# Patient Record
Sex: FEMALE | Race: ASIAN | NOT HISPANIC OR LATINO | Employment: UNEMPLOYED | URBAN - METROPOLITAN AREA
[De-identification: names, ages, dates, MRNs, and addresses within clinical notes are randomized per-mention and may not be internally consistent; named-entity substitution may affect disease eponyms.]

---

## 2017-10-02 ENCOUNTER — APPOINTMENT (OUTPATIENT)
Dept: WOMENS IMAGING | Facility: HOSPITAL | Age: 67
End: 2017-10-02

## 2017-10-02 PROCEDURE — G0202 SCR MAMMO BI INCL CAD: HCPCS | Performed by: RADIOLOGY

## 2017-10-02 PROCEDURE — 77067 SCR MAMMO BI INCL CAD: CPT | Performed by: RADIOLOGY

## 2018-08-10 ENCOUNTER — OFFICE VISIT (OUTPATIENT)
Dept: OBSTETRICS AND GYNECOLOGY | Facility: CLINIC | Age: 68
End: 2018-08-10

## 2018-08-10 VITALS — WEIGHT: 94 LBS | SYSTOLIC BLOOD PRESSURE: 149 MMHG | HEART RATE: 57 BPM | DIASTOLIC BLOOD PRESSURE: 77 MMHG

## 2018-08-10 DIAGNOSIS — Z12.4 PAP SMEAR FOR CERVICAL CANCER SCREENING: ICD-10-CM

## 2018-08-10 DIAGNOSIS — L29.2 VULVAR ITCHING: Primary | ICD-10-CM

## 2018-08-10 PROCEDURE — 99203 OFFICE O/P NEW LOW 30 MIN: CPT | Performed by: OBSTETRICS & GYNECOLOGY

## 2018-08-10 RX ORDER — DIAPER,BRIEF,INFANT-TODD,DISP
EACH MISCELLANEOUS
Qty: 30 G | Refills: 1 | Status: SHIPPED | OUTPATIENT
Start: 2018-08-10 | End: 2018-08-10 | Stop reason: SDUPTHER

## 2018-08-10 RX ORDER — PAROXETINE 10 MG/1
TABLET, FILM COATED ORAL
COMMUNITY
Start: 2018-05-15 | End: 2022-04-29 | Stop reason: SDUPTHER

## 2018-08-10 RX ORDER — LEVOTHYROXINE SODIUM 0.05 MG/1
TABLET ORAL
COMMUNITY
Start: 2018-07-16 | End: 2022-03-04 | Stop reason: SDUPTHER

## 2018-08-10 RX ORDER — LEVOTHYROXINE SODIUM 0.07 MG/1
TABLET ORAL
COMMUNITY
Start: 2018-07-31 | End: 2022-01-28

## 2018-08-10 RX ORDER — DIAPER,BRIEF,INFANT-TODD,DISP
EACH MISCELLANEOUS
Qty: 30 G | Refills: 1 | Status: SHIPPED | OUTPATIENT
Start: 2018-08-10 | End: 2019-08-10

## 2018-08-10 NOTE — PROGRESS NOTES
Subjective   Nereyda Estrella is a 67 y.o. female here for vaginal itching.      History of Present Illness   Patient is a 67-year-old  female that presents today complaining of a two-week history of vulvar and vaginal itching.  She states she was seen at an urgent care center 1 week ago and was given a seven-day course of intravaginal medication for yeast infection.  She states that the vaginal itching has improved but she continues to have itching on her vulva and above her clitoris.  Patient states she underwent menopause at the age of 55 and denies any bleeding since then.  She states she is not sexually active due to pain with intercourse.  She denies any vaginal bleeding.  Patient denies any history of STDs.  She denies any history of abnormal Pap smears.  Patient states that she has recently been using Summers Brenda vaginal wash and denies any changes in detergents, soaps, or fabrics.    The following portions of the patient's history were reviewed and updated as appropriate: allergies, current medications, past family history, past medical history, past social history, past surgical history and problem list.    Review of Systems   Genitourinary: Positive for vaginal pain. Negative for vaginal bleeding.   All other systems reviewed and are negative.      Objective   Physical Exam   Constitutional: She appears well-developed and well-nourished.   Cardiovascular: Normal rate and regular rhythm.    Pulmonary/Chest: Effort normal and breath sounds normal.   Abdominal: Soft. She exhibits no distension. There is no tenderness.   Genitourinary: Cervix normal. There is no rash, tenderness or lesion on the right labia. There is no rash, tenderness or lesion on the left labia. Vagina exhibits loss of rugae. Vagina exhibits no lesion. No erythema, tenderness or bleeding in the vagina. No foreign body in the vagina. No signs of injury around the vagina. No vaginal discharge found.   Neurological: She is alert.    Psychiatric: She has a normal mood and affect.   Vitals reviewed.        Assessment/Plan   Nereyda was seen today for vaginal pain.    Diagnoses and all orders for this visit:    Vulvar itching  -     NuSwab VG+ - Swab, Vagina  -     Discontinue: hydrocortisone 1 % cream; Apply to affected area 2 times daily  -     hydrocortisone 1 % cream; Apply to affected area 2 times daily    Pap smear for cervical cancer screening  -     IGP, Aptima HPV, Rfx 16 / 18,45      No abnormalities were noted on physical exam.  She is noted to have vaginal atrophy.  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  Will plan on trying a course of topical steroid ointment to see if itching improves.  Discussed perineal hygiene and to avoid feminine wash and to use Dove sensitive soap instead.  Also advised to avoid any douching.  She will return in 1 month for followup.

## 2018-08-15 LAB
A VAGINAE DNA VAG QL NAA+PROBE: NORMAL SCORE
BVAB2 DNA VAG QL NAA+PROBE: NORMAL SCORE
C ALBICANS DNA VAG QL NAA+PROBE: NEGATIVE
C GLABRATA DNA VAG QL NAA+PROBE: NEGATIVE
C TRACH RRNA SPEC QL NAA+PROBE: NEGATIVE
CYTOLOGIST CVX/VAG CYTO: NORMAL
CYTOLOGY CVX/VAG DOC THIN PREP: NORMAL
DX ICD CODE: NORMAL
HIV 1 & 2 AB SER-IMP: NORMAL
HPV I/H RISK 4 DNA CVX QL PROBE+SIG AMP: NEGATIVE
MEGA1 DNA VAG QL NAA+PROBE: NORMAL SCORE
N GONORRHOEA RRNA SPEC QL NAA+PROBE: NEGATIVE
OTHER STN SPEC: NORMAL
PATH REPORT.FINAL DX SPEC: NORMAL
STAT OF ADQ CVX/VAG CYTO-IMP: NORMAL
T VAGINALIS RRNA SPEC QL NAA+PROBE: NEGATIVE

## 2018-08-16 ENCOUNTER — TELEPHONE (OUTPATIENT)
Dept: OBSTETRICS AND GYNECOLOGY | Facility: CLINIC | Age: 68
End: 2018-08-16

## 2018-08-16 NOTE — TELEPHONE ENCOUNTER
----- Message from Rosi Dozier MD sent at 8/15/2018  8:38 AM EDT -----  Let patient know her vaginal swab is negative for infection

## 2018-08-27 ENCOUNTER — TELEPHONE (OUTPATIENT)
Dept: OBSTETRICS AND GYNECOLOGY | Facility: CLINIC | Age: 68
End: 2018-08-27

## 2018-08-27 NOTE — TELEPHONE ENCOUNTER
Patient's most recent vaginal culture on 8/10 was negative for yeast infection.  I had started her on topical hydrocortisone and she is scheduled to see me on 9/14.  I would like to see her for followup before I send in any other prescriptions.      Pt called and stated she gets recurring yeast infections. She used to be prescribed a capsule that was gel like and you insert it. Pt stated she does not remember the name of it but was wanting to know if you knew what it was and if so if she can have it called in. Pt stated it worked well for her in the past. Please advise.

## 2018-09-14 ENCOUNTER — OFFICE VISIT (OUTPATIENT)
Dept: OBSTETRICS AND GYNECOLOGY | Facility: CLINIC | Age: 68
End: 2018-09-14

## 2018-09-14 VITALS — WEIGHT: 90.6 LBS | DIASTOLIC BLOOD PRESSURE: 71 MMHG | SYSTOLIC BLOOD PRESSURE: 137 MMHG | HEART RATE: 55 BPM

## 2018-09-14 DIAGNOSIS — L29.2 VULVAR ITCHING: Primary | ICD-10-CM

## 2018-09-14 PROCEDURE — 99213 OFFICE O/P EST LOW 20 MIN: CPT | Performed by: OBSTETRICS & GYNECOLOGY

## 2018-09-14 NOTE — PROGRESS NOTES
Subjective   Nereyda Estrella is a 68 y.o. female here for follow up for vaginal itching     History of Present Illness   Patient presents today for follow-up regarding vulvar itching.  She was seen several months ago and vaginal culture was negative and Pap smear was normal.  She was started on topical hydrocortisone.  Patient did see a mild improvement with hydrocortisone and she states she also used over-the-counter miconazole suppositories.  Today, she is not using any medication for the itching and states that symptoms are not present.  She does complain of some vaginal dryness and feels that her symptoms are related to the dryness.    The following portions of the patient's history were reviewed and updated as appropriate: allergies, current medications, past family history, past medical history, past social history, past surgical history and problem list.    Review of Systems   Genitourinary: Negative for vaginal discharge and vaginal pain.   All other systems reviewed and are negative.      Objective   Physical Exam   Constitutional: She appears well-developed and well-nourished.   Genitourinary: There is no rash, tenderness or lesion on the right labia. There is no rash, tenderness or lesion on the left labia.   Genitourinary Comments: Vaginal atrophy and narrowing of introitus noted   Neurological: She is alert.   Psychiatric: She has a normal mood and affect.   Vitals reviewed.        Assessment/Plan   Nereyda was seen today for follow-up.    Diagnoses and all orders for this visit:    Vulvar itching    Symptoms have currently resolved.  Explained that she may use hydrocortisone as needed for the itching.  Also recommend a vaginal moisturizer to help retain moisture.  Patient may follow-up as needed.

## 2022-01-28 ENCOUNTER — OFFICE VISIT (OUTPATIENT)
Dept: FAMILY MEDICINE CLINIC | Facility: CLINIC | Age: 72
End: 2022-01-28

## 2022-01-28 VITALS
DIASTOLIC BLOOD PRESSURE: 68 MMHG | HEART RATE: 56 BPM | BODY MASS INDEX: 18.42 KG/M2 | WEIGHT: 93.8 LBS | OXYGEN SATURATION: 97 % | SYSTOLIC BLOOD PRESSURE: 102 MMHG | HEIGHT: 60 IN | RESPIRATION RATE: 20 BRPM | TEMPERATURE: 96.2 F

## 2022-01-28 DIAGNOSIS — F41.8 MIXED ANXIETY DEPRESSIVE DISORDER: ICD-10-CM

## 2022-01-28 DIAGNOSIS — Z20.822 EXPOSURE TO COVID-19 VIRUS: ICD-10-CM

## 2022-01-28 DIAGNOSIS — E03.9 HYPOTHYROIDISM (ACQUIRED): Primary | ICD-10-CM

## 2022-01-28 PROCEDURE — 99202 OFFICE O/P NEW SF 15 MIN: CPT | Performed by: FAMILY MEDICINE

## 2022-01-28 NOTE — PROGRESS NOTES
HPI  Nereyda Estrella is a 71 y.o. female who is here to establish new primary care physician along with her . Apparently is on very low-dose thyroid replacement which will be continued pending follow-up visit and hopefully more information obtained from previous physician.  recently exposed to Covid and therefore both patients will be tested. Both are fully vaccinated and deny any symptoms.      Review of Systems   Psychiatric/Behavioral:        Apparently had panic disorder related to a trip overseas and was placed on Paxil which she has continued since   All other systems reviewed and are negative.        Past Medical History:   Diagnosis Date   • Hypothyroidism        No past surgical history on file.    Family History   Problem Relation Age of Onset   • Breast cancer Sister    • Hepatitis Mother    • Cancer Father        Social History     Socioeconomic History   • Marital status: Unknown   Tobacco Use   • Smoking status: Never Smoker   • Smokeless tobacco: Never Used   Substance and Sexual Activity   • Alcohol use: No   • Drug use: No   • Sexual activity: Defer       Vitals:    01/28/22 1026   BP: 102/68   Pulse: 56   Resp: 20   Temp: 96.2 °F (35.7 °C)   SpO2: 97%        Body mass index is 18.32 kg/m².      Physical Exam  Vitals and nursing note reviewed.   Constitutional:       General: She is not in acute distress.     Appearance: She is well-developed.   HENT:      Head: Normocephalic and atraumatic.      Nose:      Comments: Patient and  with masks. Provider with N95 mask head cover shield and gloves as well as gown.  Eyes:      Conjunctiva/sclera: Conjunctivae normal.      Pupils: Pupils are equal, round, and reactive to light.   Neck:      Thyroid: No thyromegaly.   Cardiovascular:      Rate and Rhythm: Normal rate and regular rhythm.      Heart sounds: Normal heart sounds.   Pulmonary:      Effort: Pulmonary effort is normal. No respiratory distress.      Breath sounds: Normal breath  sounds.   Abdominal:      General: There is no distension.      Palpations: Abdomen is soft. There is no mass.      Tenderness: There is no abdominal tenderness.      Hernia: No hernia is present.   Musculoskeletal:         General: No tenderness or deformity. Normal range of motion.      Cervical back: Normal range of motion.   Lymphadenopathy:      Cervical: No cervical adenopathy.   Skin:     General: Skin is warm and dry.      Coloration: Skin is not pale.      Findings: No rash.   Neurological:      General: No focal deficit present.      Mental Status: She is alert and oriented to person, place, and time.      Motor: No abnormal muscle tone.      Coordination: Coordination normal.   Psychiatric:         Mood and Affect: Mood normal.         Behavior: Behavior normal.         Thought Content: Thought content normal.         Judgment: Judgment normal.           Assessment/Plan    Diagnoses and all orders for this visit:    1. Hypothyroidism (acquired) (Primary)    2. Mixed anxiety depressive disorder      Patient here to establish a new primary care physician. Records reviewed as best possible. Hopefully can get more extensive records from previous primary care physician and recheck in 3 months. Especially discussed need to check thyroid panel to see if medication needs to be adjusted?

## 2022-01-30 LAB
LABCORP SARS-COV-2, NAA 2 DAY TAT: NORMAL
SARS-COV-2 RNA RESP QL NAA+PROBE: NOT DETECTED

## 2022-03-04 RX ORDER — LEVOTHYROXINE SODIUM 0.05 MG/1
50 TABLET ORAL DAILY
Qty: 90 TABLET | Refills: 1 | Status: SHIPPED | OUTPATIENT
Start: 2022-03-04 | End: 2022-04-30 | Stop reason: SDUPTHER

## 2022-03-04 NOTE — TELEPHONE ENCOUNTER
Rx Refill Note  Requested Prescriptions     Pending Prescriptions Disp Refills   • levothyroxine (SYNTHROID, LEVOTHROID) 50 MCG tablet        Last office visit with prescribing clinician: 1/28/2022      Next office visit with prescribing clinician: 4/29/2022            Ivana Alcala MA  03/04/22, 08:56 EST

## 2022-03-04 NOTE — TELEPHONE ENCOUNTER
Caller: Nereyda Estrella    Relationship: Self    Best call back number: [phone]    Requested Prescriptions:   Requested Prescriptions     Pending Prescriptions Disp Refills   • levothyroxine (SYNTHROID, LEVOTHROID) 50 MCG tablet          Pharmacy where request should be sent: [facility] [address] [place] [address] [address] AT [facility] - [phone]  - [phone] FX     Additional details provided by patient: PATIENT STATES SHE IS OUT AND HAS AN UPCOMING APPOINTMENT. REQUESTS A 90 DAY REFILL    Does the patient have less than a 3 day supply:  [x] Yes  [] No    Dany Cartwright Rep   03/04/22 08:45 EST

## 2022-04-29 ENCOUNTER — OFFICE VISIT (OUTPATIENT)
Dept: FAMILY MEDICINE CLINIC | Facility: CLINIC | Age: 72
End: 2022-04-29

## 2022-04-29 VITALS
HEART RATE: 62 BPM | HEIGHT: 60 IN | WEIGHT: 90.6 LBS | RESPIRATION RATE: 20 BRPM | DIASTOLIC BLOOD PRESSURE: 68 MMHG | TEMPERATURE: 96.8 F | OXYGEN SATURATION: 96 % | SYSTOLIC BLOOD PRESSURE: 110 MMHG | BODY MASS INDEX: 17.79 KG/M2

## 2022-04-29 DIAGNOSIS — F41.8 MIXED ANXIETY DEPRESSIVE DISORDER: ICD-10-CM

## 2022-04-29 DIAGNOSIS — Z79.899 HIGH RISK MEDICATION USE: ICD-10-CM

## 2022-04-29 DIAGNOSIS — M54.16 LUMBAR RADICULOPATHY, RIGHT: ICD-10-CM

## 2022-04-29 DIAGNOSIS — E03.9 HYPOTHYROIDISM (ACQUIRED): Primary | ICD-10-CM

## 2022-04-29 PROCEDURE — 99213 OFFICE O/P EST LOW 20 MIN: CPT | Performed by: FAMILY MEDICINE

## 2022-04-29 RX ORDER — PAROXETINE 10 MG/1
10 TABLET, FILM COATED ORAL DAILY
Qty: 90 TABLET | Refills: 0 | Status: SHIPPED | OUTPATIENT
Start: 2022-04-29 | End: 2022-08-01 | Stop reason: SDUPTHER

## 2022-04-29 NOTE — PROGRESS NOTES
HPI  Nereyda Estrella is a 71 y.o. female who is here for follow up of anxiety depressive disorder and hypothyroid.  Patient complains of pain going up the right leg.  This seems to be worse when she is standing.  Discussed most likely etiology is lumbar radiculopathy and recommend getting x-ray.  Patient prefers to wait for now.  Discussed getting routine lab work since have yet to obtain old records previously requested?  Is on extremely low-dose thyroid medication and will await results to decide on continuation.      Review of Systems   All other systems reviewed and are negative.        Past Medical History:   Diagnosis Date   • Hypothyroidism        No past surgical history on file.    Family History   Problem Relation Age of Onset   • Breast cancer Sister    • Hepatitis Mother    • Cancer Father        Social History     Socioeconomic History   • Marital status: Unknown   Tobacco Use   • Smoking status: Never Smoker   • Smokeless tobacco: Never Used   Substance and Sexual Activity   • Alcohol use: No   • Drug use: No   • Sexual activity: Defer       Vitals:    04/29/22 1459   BP: 110/68   Pulse: 62   Resp: 20   Temp: 96.8 °F (36 °C)   SpO2: 96%        Body mass index is 17.69 kg/m².      Physical Exam  Vitals and nursing note reviewed. Exam conducted with a chaperone present.   Constitutional:       General: She is not in acute distress.     Appearance: She is well-developed.   HENT:      Head: Normocephalic and atraumatic.      Nose:      Comments: Patient and  with masks.  Provider with mask and shield  Eyes:      Conjunctiva/sclera: Conjunctivae normal.      Pupils: Pupils are equal, round, and reactive to light.   Neck:      Thyroid: No thyromegaly.   Cardiovascular:      Rate and Rhythm: Normal rate and regular rhythm.      Heart sounds: Normal heart sounds.   Pulmonary:      Effort: Pulmonary effort is normal. No respiratory distress.      Breath sounds: Normal breath sounds.   Abdominal:       General: There is no distension.      Palpations: Abdomen is soft. There is no mass.      Tenderness: There is no abdominal tenderness.      Hernia: No hernia is present.   Musculoskeletal:         General: No tenderness or deformity. Normal range of motion.      Cervical back: Normal range of motion.   Lymphadenopathy:      Cervical: No cervical adenopathy.   Skin:     General: Skin is warm and dry.      Coloration: Skin is not pale.      Findings: No rash.   Neurological:      General: No focal deficit present.      Mental Status: She is alert and oriented to person, place, and time.      Motor: No abnormal muscle tone.      Coordination: Coordination normal.   Psychiatric:         Mood and Affect: Mood normal.         Behavior: Behavior normal.         Thought Content: Thought content normal.         Judgment: Judgment normal.           Assessment/Plan    Diagnoses and all orders for this visit:    1. Hypothyroidism (acquired) (Primary)  -     TSH+Free T4    2. High risk medication use  -     CBC & Differential  -     Comprehensive Metabolic Panel    3. Lumbar radiculopathy, right        Patient here for routine follow-up after initial visit.  Reports pain going up the right leg as discussed above.  Strongly suspect lumbar radicular symptoms and recommend getting x-ray but patient prefers to wait for now.  Will await lab work to decide if need to adjust thyroid medication.  Otherwise recommend finding new primary care physician at next visit and hopefully they will have better luck finding old records?  Also hopefully new primary care physician can update health maintenance issues.  Would assume gynecologist and have got mammograms bone density tests etc.?

## 2022-04-30 LAB
ALBUMIN SERPL-MCNC: 4.8 G/DL (ref 3.7–4.7)
ALBUMIN/GLOB SERPL: 2.1 {RATIO} (ref 1.2–2.2)
ALP SERPL-CCNC: 100 IU/L (ref 44–121)
ALT SERPL-CCNC: 15 IU/L (ref 0–32)
AST SERPL-CCNC: 14 IU/L (ref 0–40)
BASOPHILS # BLD AUTO: 0 X10E3/UL (ref 0–0.2)
BASOPHILS NFR BLD AUTO: 1 %
BILIRUB SERPL-MCNC: 0.3 MG/DL (ref 0–1.2)
BUN SERPL-MCNC: 23 MG/DL (ref 8–27)
BUN/CREAT SERPL: 32 (ref 12–28)
CALCIUM SERPL-MCNC: 10 MG/DL (ref 8.7–10.3)
CHLORIDE SERPL-SCNC: 102 MMOL/L (ref 96–106)
CO2 SERPL-SCNC: 22 MMOL/L (ref 20–29)
CREAT SERPL-MCNC: 0.71 MG/DL (ref 0.57–1)
EGFRCR SERPLBLD CKD-EPI 2021: 91 ML/MIN/1.73
EOSINOPHIL # BLD AUTO: 0.2 X10E3/UL (ref 0–0.4)
EOSINOPHIL NFR BLD AUTO: 4 %
ERYTHROCYTE [DISTWIDTH] IN BLOOD BY AUTOMATED COUNT: 12.9 % (ref 11.7–15.4)
GLOBULIN SER CALC-MCNC: 2.3 G/DL (ref 1.5–4.5)
GLUCOSE SERPL-MCNC: 94 MG/DL (ref 65–99)
HCT VFR BLD AUTO: 41.4 % (ref 34–46.6)
HGB BLD-MCNC: 14 G/DL (ref 11.1–15.9)
IMM GRANULOCYTES # BLD AUTO: 0 X10E3/UL (ref 0–0.1)
IMM GRANULOCYTES NFR BLD AUTO: 0 %
LYMPHOCYTES # BLD AUTO: 1.7 X10E3/UL (ref 0.7–3.1)
LYMPHOCYTES NFR BLD AUTO: 32 %
MCH RBC QN AUTO: 31.3 PG (ref 26.6–33)
MCHC RBC AUTO-ENTMCNC: 33.8 G/DL (ref 31.5–35.7)
MCV RBC AUTO: 93 FL (ref 79–97)
MONOCYTES # BLD AUTO: 0.3 X10E3/UL (ref 0.1–0.9)
MONOCYTES NFR BLD AUTO: 6 %
NEUTROPHILS # BLD AUTO: 3 X10E3/UL (ref 1.4–7)
NEUTROPHILS NFR BLD AUTO: 57 %
PLATELET # BLD AUTO: 267 X10E3/UL (ref 150–450)
POTASSIUM SERPL-SCNC: 4.2 MMOL/L (ref 3.5–5.2)
PROT SERPL-MCNC: 7.1 G/DL (ref 6–8.5)
RBC # BLD AUTO: 4.47 X10E6/UL (ref 3.77–5.28)
SODIUM SERPL-SCNC: 140 MMOL/L (ref 134–144)
T4 FREE SERPL-MCNC: 1.24 NG/DL (ref 0.82–1.77)
TSH SERPL DL<=0.005 MIU/L-ACNC: 1.19 UIU/ML (ref 0.45–4.5)
WBC # BLD AUTO: 5.3 X10E3/UL (ref 3.4–10.8)

## 2022-04-30 RX ORDER — LEVOTHYROXINE SODIUM 0.05 MG/1
50 TABLET ORAL DAILY
Qty: 90 TABLET | Refills: 1 | Status: SHIPPED | OUTPATIENT
Start: 2022-04-30 | End: 2022-08-01 | Stop reason: SDUPTHER

## 2022-07-14 ENCOUNTER — OFFICE VISIT (OUTPATIENT)
Dept: INTERNAL MEDICINE | Facility: CLINIC | Age: 72
End: 2022-07-14

## 2022-07-14 VITALS
OXYGEN SATURATION: 95 % | TEMPERATURE: 97.2 F | BODY MASS INDEX: 17.87 KG/M2 | SYSTOLIC BLOOD PRESSURE: 146 MMHG | DIASTOLIC BLOOD PRESSURE: 74 MMHG | HEART RATE: 66 BPM | WEIGHT: 91 LBS | HEIGHT: 60 IN

## 2022-07-14 DIAGNOSIS — F41.9 ANXIETY AND DEPRESSION: ICD-10-CM

## 2022-07-14 DIAGNOSIS — E03.9 HYPOTHYROIDISM, UNSPECIFIED TYPE: Primary | ICD-10-CM

## 2022-07-14 DIAGNOSIS — R03.0 ELEVATED BLOOD PRESSURE READING: ICD-10-CM

## 2022-07-14 DIAGNOSIS — R10.13 DYSPEPSIA: ICD-10-CM

## 2022-07-14 DIAGNOSIS — F32.A ANXIETY AND DEPRESSION: ICD-10-CM

## 2022-07-14 DIAGNOSIS — Z13.220 SCREENING FOR HYPERLIPIDEMIA: ICD-10-CM

## 2022-07-14 DIAGNOSIS — Z11.59 NEED FOR HEPATITIS C SCREENING TEST: ICD-10-CM

## 2022-07-14 DIAGNOSIS — Z12.31 ENCOUNTER FOR SCREENING MAMMOGRAM FOR MALIGNANT NEOPLASM OF BREAST: ICD-10-CM

## 2022-07-14 PROCEDURE — 99214 OFFICE O/P EST MOD 30 MIN: CPT | Performed by: STUDENT IN AN ORGANIZED HEALTH CARE EDUCATION/TRAINING PROGRAM

## 2022-07-14 NOTE — PROGRESS NOTES
Brandin Rodriguez D.O.  Internal Medicine  [facility]  [address], [address]  [place] [address]  [phone]      Chief Complaint  Establish Care    SUBJECTIVE    History of Present Illness    Nereyda Estrella is a 71 y.o. female who presents to the office today as a new patient to establish care.   Transferring care from Dr Cuevas who is retiring. Patient only saw him for several months. Prior to that she went to [facility] Hayden Jr., MD with [facility].     Anxiety and depression: takes paroxetine 10 mg daily, feels that it helps. She does prayer and deep breathing if she is getting anxious.     Hypothyroidism: takes levothyroxine 50 mcg daily; denies having any thyroid nodules/cancer but states she had enlarged eyes at one time and it may have been high at that time, years ago.     Over the last few months she has felt especially gassy, felt discomfort in the top/center of the stomach.   Taking a few sips of 7 up helps. No blood in her stool. Feels that she may have been losing a few pounds of weight but states that she is on a soft diet because of dentures making it hard for her to chew. She has not had trouble swallowing.     Allergies   Allergen Reactions   • Aspirin Swelling        Outpatient Medications Marked as Taking for the 7/14/22 encounter (Office Visit) with Brandin Rodriguez, DO   Medication Sig Dispense Refill   • levothyroxine (SYNTHROID, LEVOTHROID) 50 MCG tablet Take 1 tablet by mouth Daily. 90 tablet 1   • PARoxetine (PAXIL) 10 MG tablet Take 1 tablet by mouth Daily. 90 tablet 0        Past Medical History:   Diagnosis Date   • Anxiety and depression    • Hypothyroidism      Past Surgical History:   Procedure Laterality Date   • NO PAST SURGERIES       Family History   Problem Relation Age of Onset   • Hepatitis Mother         from blood transfusion   • Prostate cancer Father    • Breast cancer Sister    • No Known Problems Sister    • No Known Problems Sister    • No  "Known Problems Sister    • No Known Problems Brother    • No Known Problems Brother    • No Known Problems Brother     reports that she has quit smoking. Her smoking use included cigarettes. She smoked 0.10 packs per day. She has never used smokeless tobacco. She reports current alcohol use of about 7.0 standard drinks of alcohol per week. She reports that she does not use drugs.    OBJECTIVE    Vital Signs:   /74   Pulse 66   Temp 97.2 °F (36.2 °C) (Temporal)   Ht 152.4 cm (60\")   Wt 41.3 kg (91 lb)   SpO2 95%   BMI 17.77 kg/m²     Physical Exam  Vitals reviewed.   Constitutional:       General: She is not in acute distress.     Appearance: Normal appearance. She is not ill-appearing.      Comments: Underweight     HENT:      Head: Normocephalic and atraumatic.   Eyes:      General: No scleral icterus.  Cardiovascular:      Rate and Rhythm: Normal rate and regular rhythm.      Heart sounds: Normal heart sounds. No murmur heard.  Pulmonary:      Effort: Pulmonary effort is normal. No respiratory distress.      Breath sounds: Normal breath sounds. No wheezing or rhonchi.   Abdominal:      General: Bowel sounds are normal. There is no distension.      Palpations: Abdomen is soft.      Tenderness: There is no abdominal tenderness. There is no guarding.   Musculoskeletal:      Right lower leg: No edema.      Left lower leg: No edema.   Neurological:      Mental Status: She is alert.   Psychiatric:         Mood and Affect: Mood normal.         Behavior: Behavior normal.         Thought Content: Thought content normal.            The following data was reviewed by: Brandin Rodriguez DO on 07/14/2022:  Common labs    Common Labsle 4/29/22 4/29/22    1544 1544   Glucose  94   BUN  23   Creatinine  0.71   Sodium  140   Potassium  4.2   Chloride  102   Calcium  10.0   Total Protein  7.1   Albumin  4.8 (A)   Total Bilirubin  0.3   Alkaline Phosphatase  100   AST (SGOT)  14   ALT (SGPT)  15   WBC 5.3    Hemoglobin 14.0  "   Hematocrit 41.4    Platelets 267    (A) Abnormal value                         ASSESSMENT & PLAN     Diagnoses and all orders for this visit:    1. Hypothyroidism, unspecified type (Primary)  Lab Results   Component Value Date    TSH 1.190 04/29/2022     -TSH at goal on levothyroxine 50 mcg daily , continue current regimen      2. Elevated blood pressure reading  -/74 in office  -no history of HTN  -recommended pt get automated BP machine, check BP 3-4 times weekly while being well rested and without caffeine; bring log to next visit     3. Dyspepsia  -several month duration of increased abdominal gas/fullness as well as discomfort sensation in the epigastric region; denies blood in the stool or dysphagia  -no abdominal pain on exam  -in the setting of having some weight loss, BMI 17.8, as well as age I would like for her to see GI for consideration of EGD  -patient seemed hesitant to this but I explained importance of identifying gastritis/ulcers/potential cancer worse case and she seemed agreeable to referral    4. Anxiety and depression  -controlled with paroxetine 10 mg daily, continue     5. Need for hepatitis C screening test  -     Hepatitis C antibody    6. Screening for hyperlipidemia  -     Lipid Panel With LDL/HDL Ratio    7. Encounter for screening mammogram for malignant neoplasm of breast  -     Mammo Screening Digital Tomosynthesis Bilateral With CAD            The following social determinates of health impact the patient's medical decision making: No social determinates of health were factored in to today's visit.     Follow Up  Return in about 2 months (around 9/14/2022) for Annual physical.    Patient/family had no further questions at this time and verbalized understanding of the plan discussed today.

## 2022-07-15 ENCOUNTER — PATIENT ROUNDING (BHMG ONLY) (OUTPATIENT)
Dept: INTERNAL MEDICINE | Facility: CLINIC | Age: 72
End: 2022-07-15

## 2022-07-15 LAB
CHOLEST SERPL-MCNC: 285 MG/DL (ref 100–199)
HCV AB S/CO SERPL IA: 0.1 S/CO RATIO (ref 0–0.9)
HDLC SERPL-MCNC: 62 MG/DL
LDLC SERPL CALC-MCNC: 204 MG/DL (ref 0–99)
LDLC/HDLC SERPL: 3.3 RATIO (ref 0–3.2)
TRIGL SERPL-MCNC: 111 MG/DL (ref 0–149)
VLDLC SERPL CALC-MCNC: 19 MG/DL (ref 5–40)

## 2022-07-19 ENCOUNTER — APPOINTMENT (OUTPATIENT)
Dept: WOMENS IMAGING | Facility: HOSPITAL | Age: 72
End: 2022-07-19

## 2022-07-19 PROCEDURE — 77063 BREAST TOMOSYNTHESIS BI: CPT | Performed by: RADIOLOGY

## 2022-07-19 PROCEDURE — 77067 SCR MAMMO BI INCL CAD: CPT | Performed by: RADIOLOGY

## 2022-08-01 DIAGNOSIS — F41.8 MIXED ANXIETY DEPRESSIVE DISORDER: ICD-10-CM

## 2022-08-01 RX ORDER — LEVOTHYROXINE SODIUM 0.05 MG/1
50 TABLET ORAL DAILY
Qty: 90 TABLET | Refills: 1 | Status: SHIPPED | OUTPATIENT
Start: 2022-08-01

## 2022-08-01 RX ORDER — PAROXETINE 10 MG/1
10 TABLET, FILM COATED ORAL DAILY
Qty: 90 TABLET | Refills: 3 | Status: SHIPPED | OUTPATIENT
Start: 2022-08-01

## 2022-08-01 NOTE — TELEPHONE ENCOUNTER
Caller: Sameer Zuleika    Relationship: Self    Best call back number: [phone]    Requested Prescriptions:   Requested Prescriptions     Pending Prescriptions Disp Refills   • PARoxetine (PAXIL) 10 MG tablet 90 tablet 0     Sig: Take 1 tablet by mouth Daily.   • levothyroxine (SYNTHROID, LEVOTHROID) 50 MCG tablet 90 tablet 1     Sig: Take 1 tablet by mouth Daily.        Pharmacy where request should be sent: [facility] [address] [place] [address] RD AT [facility] [phone] [facility] [phone] FX     Additional details provided by patient: PATIENT IS OUT OF HER PAROXETINE TODAY , BUT HAS A WEEK LEFT OF THE LEVOTHYROXINE.  PATIENT ALSO STATED THAT [facility] ADVISED HER THAT THEY REQUESTED HER MEDICATION ON 7/26, BUT NO RESPONSE.    Does the patient have less than a 3 day supply:  [x] Yes  [] No    Dany Mcgrath   08/01/22 09:07 EDT

## 2022-08-02 ENCOUNTER — TELEPHONE (OUTPATIENT)
Dept: INTERNAL MEDICINE | Facility: CLINIC | Age: 72
End: 2022-08-02

## 2022-08-26 ENCOUNTER — APPOINTMENT (OUTPATIENT)
Dept: WOMENS IMAGING | Facility: HOSPITAL | Age: 72
End: 2022-08-26

## 2022-08-26 PROCEDURE — 77061 BREAST TOMOSYNTHESIS UNI: CPT | Performed by: RADIOLOGY

## 2022-08-26 PROCEDURE — 76642 ULTRASOUND BREAST LIMITED: CPT | Performed by: RADIOLOGY

## 2022-08-26 PROCEDURE — 77065 DX MAMMO INCL CAD UNI: CPT | Performed by: RADIOLOGY

## 2022-08-26 PROCEDURE — G0279 TOMOSYNTHESIS, MAMMO: HCPCS | Performed by: RADIOLOGY

## 2022-10-05 ENCOUNTER — OFFICE VISIT (OUTPATIENT)
Dept: INTERNAL MEDICINE | Facility: CLINIC | Age: 72
End: 2022-10-05

## 2022-10-05 VITALS
WEIGHT: 91.2 LBS | HEART RATE: 60 BPM | BODY MASS INDEX: 17.91 KG/M2 | TEMPERATURE: 97.8 F | SYSTOLIC BLOOD PRESSURE: 140 MMHG | OXYGEN SATURATION: 97 % | HEIGHT: 60 IN | DIASTOLIC BLOOD PRESSURE: 72 MMHG

## 2022-10-05 DIAGNOSIS — Z00.00 ANNUAL PHYSICAL EXAM: Primary | ICD-10-CM

## 2022-10-05 DIAGNOSIS — I10 ESSENTIAL HYPERTENSION: ICD-10-CM

## 2022-10-05 DIAGNOSIS — Z78.0 POSTMENOPAUSAL: ICD-10-CM

## 2022-10-05 DIAGNOSIS — E78.00 PURE HYPERCHOLESTEROLEMIA: ICD-10-CM

## 2022-10-05 DIAGNOSIS — H61.23 BILATERAL IMPACTED CERUMEN: ICD-10-CM

## 2022-10-05 PROCEDURE — 99397 PER PM REEVAL EST PAT 65+ YR: CPT | Performed by: STUDENT IN AN ORGANIZED HEALTH CARE EDUCATION/TRAINING PROGRAM

## 2022-10-05 PROCEDURE — 99214 OFFICE O/P EST MOD 30 MIN: CPT | Performed by: STUDENT IN AN ORGANIZED HEALTH CARE EDUCATION/TRAINING PROGRAM

## 2022-10-05 RX ORDER — AMLODIPINE BESYLATE 2.5 MG/1
2.5 TABLET ORAL DAILY
Qty: 30 TABLET | Refills: 1 | Status: SHIPPED | OUTPATIENT
Start: 2022-10-05 | End: 2022-11-30

## 2022-10-05 RX ORDER — ATORVASTATIN CALCIUM 40 MG/1
40 TABLET, FILM COATED ORAL DAILY
Qty: 90 TABLET | Refills: 1 | Status: SHIPPED | OUTPATIENT
Start: 2022-10-05 | End: 2023-03-23

## 2022-10-05 NOTE — PROGRESS NOTES
Brandin Rodriguez D.O.  Internal Medicine  [facility]  [address], [address]  [place] [address]  [phone]    Chief Complaint  Annual checkup    HISTORY    Nereyda Estrella is a 72 y.o. female who presents to the office today as a  an established patient for their annual preventative exam.     No hospitalization(s) within the last year.     Status of chronic medical conditions:    Anxiety and depression: takes paroxetine 10 mg daily, feels that it helps.      Hypothyroidism: takes levothyroxine 50 mcg daily; denies having any thyroid nodules/cancer but states she had enlarged eyes at one time and it may have been high at that time, years ago.      Patient's overall comments about their health or any other specific health concerns they report: none    First day of last menstrual period if pre-menopausal: post menopausal       Health Maintenance Summary          Ordered - MAMMOGRAM (Every 2 Years) Ordered on 7/14/2022    No completion history exists for this topic.          Ordered - DXA SCAN (Every 2 Years) Ordered on 10/5/2022    No completion history exists for this topic.          Overdue - TDAP/TD VACCINES (1 - Tdap) Overdue - never done    No completion history exists for this topic.          Overdue - ZOSTER VACCINE (1 of 2) Overdue - never done    No completion history exists for this topic.          Overdue - Pneumococcal Vaccine 65+ (1 - PCV) Overdue - never done    No completion history exists for this topic.          Overdue - INFLUENZA VACCINE (Yearly - August to March) Overdue - never done    No completion history exists for this topic.          LIPID PANEL (Yearly) Next due on 7/14/2023 07/14/2022  Lipid Panel With LDL/HDL Ratio          ANNUAL PHYSICAL (Yearly) Next due on 10/6/2023    10/05/2022  Done          COLORECTAL CANCER SCREENING (COLOGUARD - Every 3 Years) Next due on 10/8/2023    10/08/2020  SCANNED - COLOGUARD          HEPATITIS C SCREENING  Completed     "07/14/2022  Hep C Virus Ab component of Hepatitis C antibody          COVID-19 Vaccine (Series Information) Completed    09/23/2022  Imm Admin: COVID-19 (PFIZER) BIVALENT BOOSTER 12+YRS    11/22/2021  Imm Admin: COVID-19 (PFIZER) PURPLE CAP    03/31/2021  Imm Admin: COVID-19 (PFIZER) PURPLE CAP    03/10/2021  Imm Admin: COVID-19 (PFIZER) PURPLE CAP                 Allergies   Allergen Reactions   • Aspirin Swelling        Outpatient Medications Marked as Taking for the 10/5/22 encounter (Office Visit) with Brandin Rodriguez DO   Medication Sig Dispense Refill   • levothyroxine (SYNTHROID, LEVOTHROID) 50 MCG tablet Take 1 tablet by mouth Daily. Take at least 30 minutes before having breakfast with a sip of water. For low thyroid. 90 tablet 1   • PARoxetine (PAXIL) 10 MG tablet Take 1 tablet by mouth Daily. 90 tablet 3        Past Medical History:   Diagnosis Date   • Anxiety and depression    • Hyperlipidemia    • Hypertension 2022   • Hypothyroidism      Past Surgical History:   Procedure Laterality Date   • NO PAST SURGERIES       Family History   Problem Relation Age of Onset   • Hepatitis Mother         from blood transfusion   • Prostate cancer Father    • Breast cancer Sister    • No Known Problems Sister    • No Known Problems Sister    • No Known Problems Sister    • No Known Problems Brother    • No Known Problems Brother    • No Known Problems Brother     reports that she has quit smoking. Her smoking use included cigarettes. She smoked 0.10 packs per day. She has never used smokeless tobacco. She reports current alcohol use of about 7.0 standard drinks of alcohol per week. She reports that she does not use drugs.    Immunization History   Administered Date(s) Administered   • COVID-19 (PFIZER) BIVALENT BOOSTER 12+YRS 09/23/2022   • COVID-19 (PFIZER) PURPLE CAP 03/10/2021, 03/31/2021, 11/22/2021        OBJECTIVE    Vital Signs:   /72   Pulse 60   Temp 97.8 °F (36.6 °C) (Infrared)   Ht 152.4 cm (60\")   " Wt 41.4 kg (91 lb 3.2 oz)   SpO2 97%   BMI 17.81 kg/m²     Physical Exam  Vitals reviewed.   Constitutional:       General: She is not in acute distress.     Appearance: Normal appearance. She is not ill-appearing.   HENT:      Head: Normocephalic and atraumatic.      Right Ear: External ear normal. There is impacted cerumen.      Left Ear: External ear normal. There is impacted cerumen.      Mouth/Throat:      Mouth: Mucous membranes are moist.      Pharynx: No oropharyngeal exudate or posterior oropharyngeal erythema.   Eyes:      General: No scleral icterus.     Extraocular Movements: Extraocular movements intact.      Conjunctiva/sclera: Conjunctivae normal.      Pupils: Pupils are equal, round, and reactive to light.   Cardiovascular:      Rate and Rhythm: Normal rate and regular rhythm.      Heart sounds: Normal heart sounds. No murmur heard.  Pulmonary:      Effort: Pulmonary effort is normal. No respiratory distress.      Breath sounds: Normal breath sounds. No wheezing.   Abdominal:      General: Bowel sounds are normal. There is no distension.      Palpations: Abdomen is soft.      Tenderness: There is no abdominal tenderness. There is no guarding.   Musculoskeletal:      Cervical back: Neck supple. No tenderness.      Right lower leg: No edema.      Left lower leg: No edema.   Lymphadenopathy:      Cervical: No cervical adenopathy.   Skin:     General: Skin is warm and dry.      Coloration: Skin is not jaundiced.   Neurological:      General: No focal deficit present.      Mental Status: She is alert and oriented to person, place, and time.      Cranial Nerves: No cranial nerve deficit.      Motor: No weakness.   Psychiatric:         Mood and Affect: Mood normal.         Behavior: Behavior normal.         Thought Content: Thought content normal.                         ASSESSMENT & PLAN     #Annual Preventative Health Examination   -Age and sex appropriate physical exam performed and documented. Updated  past medical, family, social and surgical histories as well as allergies and care team list. Addressed care gaps listed in the medical record.  -Encouraged seat belt use for every car ride for patient and all occupants. Encouraged sunscreen use to reduce risk of skin cancer for any days with sun exposure over 20 minutes.   -Encouraged annual dental and vision exams as part of their overall health.  -Encouraged minimum of 30 minutes or more of exercise at a brisk walk or higher 5 days per week combined with a well-balanced diet.  -Immunizations reviewed and updated in EMR. She declined flu shot today. Encouraged her to get her immunization records from previous PCP so that I can make further recommendations.     The 10-year ASCVD risk score (Bradly LANDEROS Jr., et al., 2013) is: 19.4%    Values used to calculate the score:      Age: 72 years      Sex: Female      Is Non- : No      Diabetic: No      Tobacco smoker: No      Systolic Blood Pressure: 140 mmHg      Is BP treated: Yes      HDL Cholesterol: 62 mg/dL      Total Cholesterol: 285 mg/dL   -Lipid screening:   Lipid Panel    Lipid Panel 7/14/22   Total Cholesterol 285 (A)   Triglycerides 111   HDL Cholesterol 62   VLDL Cholesterol 19   LDL Cholesterol  204 (A)   LDL/HDL Ratio 3.3 (A)   (A) Abnormal value       Comments are available for some flowsheets but are not being displayed.          Patient is over age 40 and a 10-year ASCVD risk was calculated which does indicate a need for statin therapy. See plan below.    -Aspirin for primary or secondary prevention: Not applicable, patient is greater than age 60 and risks outweigh benefits for primary prevention.  -Depression screening: pt has diagnosis of anxiety and depression  -Diabetes screening:  Screening not indicated at this time.   -Tobacco use screening: Patient denies cigarette use. Tobacco counseling was was not indicated.  -Alcohol use screening: Patient reports drinking 7 drinks per  week.. Alcohol abuse counseling was was not indicated.  -Illicit drug screening: Patient does not use illicit drugs.  -Hypertension screening: + screening today, see plan below  -HIV screening: Discussed with patient the importance of identifying asymptomatic HIV infection for adults in their age group with a one time screening test .Patient declined screening.   -Syphilis screening: Syphilis screening not indicated.  -Hepatitis B virus screening: Screening not indicated, not in a high-risk group.  -Hepatitis C virus screening:  Patient has already completed Hepatitis C screening. Negative screening on file.   -Colon cancer screening: Last cologuard negative 10/2020, due for repeat 10/2023  -Lung cancer screening: Patient has smoked but does not meet other eligibility criteria for screening.  -Cervical cancer screening:  Informed patient that the USPSTF recommends screening for cervical cancer every 3 years with cervical cytology alone in women aged 21 to 29 years. For women aged 30 to 65 years, the USPSTF recommends screening every 3 years with cervical cytology alone, every 5 years with high-risk human papillomavirus (hrHPV) testing alone, or every 5 years with HPV testing in combination with cytology (cotesting). No longer of screening age.   -Breast cancer screening: Will obtain results from [facility].   -BRCA related cancer screening: Informed patient that the USPSTF recommends that primary care clinicians assess women with a personal or family history of breast, ovarian, tubal, or peritoneal cancer or who have an ancestry associated with breast cancer susceptibility 1 and 2 (BRCA1/2) gene mutations with an appropriate brief familial risk assessment tool and referred to genetic counseling if risk assessment is positive. Patient does not have a known personal or family history.   -Osteoporosis screening: Informed patient that the USPSTF recommends screening for osteoporosis with bone measurement  testing to prevent osteoporotic fractures in women 65 years and older. Will order screening today    Follow up in 1 year for annual physical exam.      A problem-based visit was also conducted on the same day, see below for assessment and plan    Diagnoses and all orders for this visit:    1. Essential hypertension (Primary)  -new diagnosis as of today  -BP has been 140/72 and 146/74 in her last two office visits with me  -she also has HLD, advised pt of importance of managing blood pressure and cholesterol to prevent future CV events  -she was agreeable to treatment, will start amlodipine 2.5 mg daily; advised her of common side effects including lower extremity edema  -follow up in 1 month for repeat BP  -     amLODIPine (NORVASC) 2.5 MG tablet; Take 1 tablet by mouth Daily.  Dispense: 30 tablet; Refill: 1    2. Pure hypercholesterolemia  Lab Results   Component Value Date    CHLPL 285 (H) 07/14/2022    TRIG 111 07/14/2022    HDL 62 07/14/2022     (H) 07/14/2022     The 10-year ASCVD risk score (Kearsargestuart LANDEROS Jr., et al., 2013) is: 19.4%    Values used to calculate the score:      Age: 72 years      Sex: Female      Is Non- : No      Diabetic: No      Tobacco smoker: No      Systolic Blood Pressure: 140 mmHg      Is BP treated: Yes      HDL Cholesterol: 62 mg/dL      Total Cholesterol: 285 mg/dL  Discussed with patient the significance of his elevated ASCVD risk and LDL cholesterol. Introduced statin based therapy. Informed pt that statins are associated with rare risk of increased liver enzymes as well as severe allergy. More common side effects include muscle aches and pains that usually go away within several weeks of therapy.   -pt reports being on atorvastatin before and tolerated it   -will restart atorvastatin at 40 mg daily  -     atorvastatin (Lipitor) 40 MG tablet; Take 1 tablet by mouth Daily.  Dispense: 90 tablet; Refill: 1    3. Bilateral impacted cerumen       -begin ear wax  softening drops           The following social determinates of health impact the patient's medical decision making: No social determinates of health were factored in to today's visit.     Follow Up  Return in about 4 weeks (around 11/2/2022) for Recheck.    Patient/family had no further questions at this time and verbalized understanding of the plan discussed today.

## 2022-11-03 ENCOUNTER — OFFICE VISIT (OUTPATIENT)
Dept: INTERNAL MEDICINE | Facility: CLINIC | Age: 72
End: 2022-11-03

## 2022-11-03 VITALS
SYSTOLIC BLOOD PRESSURE: 128 MMHG | WEIGHT: 91 LBS | TEMPERATURE: 98 F | OXYGEN SATURATION: 97 % | DIASTOLIC BLOOD PRESSURE: 64 MMHG | BODY MASS INDEX: 17.87 KG/M2 | HEIGHT: 60 IN | HEART RATE: 62 BPM

## 2022-11-03 DIAGNOSIS — Z28.21 INFLUENZA VACCINATION DECLINED: ICD-10-CM

## 2022-11-03 DIAGNOSIS — E78.00 PURE HYPERCHOLESTEROLEMIA: ICD-10-CM

## 2022-11-03 DIAGNOSIS — I10 ESSENTIAL HYPERTENSION: Primary | ICD-10-CM

## 2022-11-03 PROCEDURE — 99214 OFFICE O/P EST MOD 30 MIN: CPT | Performed by: STUDENT IN AN ORGANIZED HEALTH CARE EDUCATION/TRAINING PROGRAM

## 2022-11-03 NOTE — PROGRESS NOTES
"  Brandin Rodriguez D.O.  Internal Medicine  [facility]  [address], [address]  [place] [address]  [phone]      Chief Complaint  Hypertension    SUBJECTIVE    History of Present Illness    Nereyda Estrella is a 72 y.o. female who presents to the office today as an established patient that last saw me on 10/5/2022.     HTN: at last visit started on amlodipine 2.5 mg daily; no side effects with that medication. Doesn't check blood pressure at home.     HLD: at last visit started on atorvastatin 40 mg daily for primary prevention. No side effects that she is aware of.    Allergies   Allergen Reactions   • Aspirin Swelling        Outpatient Medications Marked as Taking for the 11/3/22 encounter (Office Visit) with Brandin Rodriguez, DO   Medication Sig Dispense Refill   • amLODIPine (NORVASC) 2.5 MG tablet Take 1 tablet by mouth Daily. 30 tablet 1   • atorvastatin (Lipitor) 40 MG tablet Take 1 tablet by mouth Daily. 90 tablet 1   • levothyroxine (SYNTHROID, LEVOTHROID) 50 MCG tablet Take 1 tablet by mouth Daily. Take at least 30 minutes before having breakfast with a sip of water. For low thyroid. 90 tablet 1   • PARoxetine (PAXIL) 10 MG tablet Take 1 tablet by mouth Daily. 90 tablet 3        Past Medical History:   Diagnosis Date   • Anxiety and depression    • Hyperlipidemia    • Hypertension 2022   • Hypothyroidism        OBJECTIVE    Vital Signs:   /64   Pulse 62   Temp 98 °F (36.7 °C) (Infrared)   Ht 152.4 cm (60\")   Wt 41.3 kg (91 lb)   SpO2 97%   BMI 17.77 kg/m²     Physical Exam  Vitals reviewed.   Constitutional:       General: She is not in acute distress.     Appearance: Normal appearance. She is not ill-appearing.      Comments: underweight   HENT:      Head: Normocephalic and atraumatic.   Eyes:      General: No scleral icterus.  Cardiovascular:      Rate and Rhythm: Normal rate and regular rhythm.      Heart sounds: Normal heart sounds. No murmur heard.  Pulmonary:      " Effort: Pulmonary effort is normal. No respiratory distress.      Breath sounds: Normal breath sounds. No wheezing.   Musculoskeletal:      Right lower leg: No edema.      Left lower leg: No edema.   Skin:     Coloration: Skin is not jaundiced.   Neurological:      Mental Status: She is alert.   Psychiatric:         Mood and Affect: Mood normal.         Behavior: Behavior normal.         Thought Content: Thought content normal.                             ASSESSMENT & PLAN     Diagnoses and all orders for this visit:    1. Essential hypertension (Primary)  -started on amlodipine 2.5 mg daily last visit  -does not check her BP at home but BP in office today is 128/64  -continue current regimen    2. Pure hypercholesterolemia  -at last visit starting atorvastatin 40 mg daily for primary prevention; no side effects reported  -will recheck lipid profile today to assess for improvement; last lipid profile is below  Lab Results   Component Value Date    CHLPL 285 (H) 07/14/2022    TRIG 111 07/14/2022    HDL 62 07/14/2022     (H) 07/14/2022       -     Lipid Panel With LDL/HDL Ratio            The following social determinates of health impact the patient's medical decision making: No social determinates of health were factored in to today's visit.     Follow Up  Return in about 6 months (around 5/3/2023) for Recheck.    Patient/family had no further questions at this time and verbalized understanding of the plan discussed today.

## 2022-11-04 LAB
CHOLEST SERPL-MCNC: 142 MG/DL (ref 0–200)
HDLC SERPL-MCNC: 60 MG/DL (ref 40–60)
LDLC SERPL CALC-MCNC: 66 MG/DL (ref 0–100)
LDLC/HDLC SERPL: 1.09 {RATIO}
TRIGL SERPL-MCNC: 84 MG/DL (ref 0–150)
VLDLC SERPL CALC-MCNC: 16 MG/DL (ref 5–40)

## 2022-11-29 DIAGNOSIS — I10 ESSENTIAL HYPERTENSION: ICD-10-CM

## 2022-11-30 RX ORDER — AMLODIPINE BESYLATE 2.5 MG/1
TABLET ORAL
Qty: 30 TABLET | Refills: 1 | Status: SHIPPED | OUTPATIENT
Start: 2022-11-30 | End: 2023-01-30 | Stop reason: SDUPTHER

## 2023-01-30 DIAGNOSIS — I10 ESSENTIAL HYPERTENSION: ICD-10-CM

## 2023-01-30 RX ORDER — AMLODIPINE BESYLATE 2.5 MG/1
2.5 TABLET ORAL DAILY
Qty: 90 TABLET | Refills: 1 | Status: SHIPPED | OUTPATIENT
Start: 2023-01-30

## 2023-03-09 ENCOUNTER — TELEPHONE (OUTPATIENT)
Dept: INTERNAL MEDICINE | Facility: CLINIC | Age: 73
End: 2023-03-09
Payer: COMMERCIAL

## 2023-03-09 NOTE — TELEPHONE ENCOUNTER
Caller: Nereyda Estrella    Relationship: Self    Best call back number: [phone]    Requested Prescriptions:   Requested Prescriptions      No prescriptions requested or ordered in this encounter        [facility] where [facility] sent[id number] [place] [id number] AT [address] [facility] [phone] [facility] [phone] FX     Additional details provided by patient: PATIENT STATES THAT SHE NEEDS HYDROCONE (NOT ON MEDLIST)     Does the patient have less than a 3 day supply:  [x] Yes  [] No    Would you like a call back once the refill request has been completed: [] Yes [x] No    If the office needs to give you a call back, can they leave a voicemail: [] Yes [x] No    Dany Encarnacion Rep   03/09/23 11:41 EST

## 2023-03-10 ENCOUNTER — TELEPHONE (OUTPATIENT)
Dept: INTERNAL MEDICINE | Facility: CLINIC | Age: 73
End: 2023-03-10
Payer: COMMERCIAL

## 2023-03-10 NOTE — TELEPHONE ENCOUNTER
"Can we verify which medication pt is referring to ? I do not see a medication called \"HYDROCONE\" on her medication list."

## 2023-03-10 NOTE — TELEPHONE ENCOUNTER
Caller: Nereyda Estrella    Relationship to patient: Self    Best call back number: [phone]    Patient is needing: PATIENT STATES THAT SHE CALLED IN THE WRONG MEDICATION YESTERDAY. SHE DOES NOT NEED HYDROCODONE.

## 2023-03-10 NOTE — TELEPHONE ENCOUNTER
Patient is requesting a refill on her amlodipine 2.5 mg patient got a 90 day supply on 1/30/23 with 1 refill. She contact  the pharmacy.

## 2023-03-23 DIAGNOSIS — E78.00 PURE HYPERCHOLESTEROLEMIA: ICD-10-CM

## 2023-03-23 RX ORDER — ATORVASTATIN CALCIUM 40 MG/1
TABLET, FILM COATED ORAL
Qty: 90 TABLET | Refills: 1 | Status: SHIPPED | OUTPATIENT
Start: 2023-03-23

## 2023-05-10 ENCOUNTER — OFFICE VISIT (OUTPATIENT)
Dept: INTERNAL MEDICINE | Facility: CLINIC | Age: 73
End: 2023-05-10
Payer: MEDICARE

## 2023-05-10 VITALS
HEIGHT: 60 IN | WEIGHT: 90 LBS | DIASTOLIC BLOOD PRESSURE: 70 MMHG | OXYGEN SATURATION: 97 % | BODY MASS INDEX: 17.67 KG/M2 | SYSTOLIC BLOOD PRESSURE: 130 MMHG | HEART RATE: 59 BPM

## 2023-05-10 DIAGNOSIS — M50.30 DDD (DEGENERATIVE DISC DISEASE), CERVICAL: ICD-10-CM

## 2023-05-10 DIAGNOSIS — E03.9 HYPOTHYROIDISM, UNSPECIFIED TYPE: ICD-10-CM

## 2023-05-10 DIAGNOSIS — I10 ESSENTIAL HYPERTENSION: Primary | ICD-10-CM

## 2023-05-10 PROCEDURE — 99214 OFFICE O/P EST MOD 30 MIN: CPT | Performed by: STUDENT IN AN ORGANIZED HEALTH CARE EDUCATION/TRAINING PROGRAM

## 2023-05-10 NOTE — PROGRESS NOTES
"  Brandin Rodriguez D.O.  Internal Medicine  [facility]  [address], [address]  [place] [address]  [phone]      Chief Complaint  Hypertension    SUBJECTIVE    History of Present Illness    Nereyda Estrella is a 72 y.o. female who presents to the office today as an established patient that last saw me on 11/3/2022.     Hypothyroidism: takes levothyroxine 50 mcg daily; denies having any thyroid nodules/cancer     HTN: taking amlodipine 2.5 mg daily. Doesn't check blood pressure at home.     States she has been dealing with some neck pain , worse when she wakes up in the morning and feels that it radiates into her shoulders. She takes Tylenol and iburpofen OTC but uses these infrequently. She is curious if I have a pillow recommendation which could be good for her.     Allergies   Allergen Reactions   • Aspirin Swelling        Outpatient Medications Marked as Taking for the 5/10/23 encounter (Office Visit) with Brandin Rodriguez, DO   Medication Sig Dispense Refill   • amLODIPine (NORVASC) 2.5 MG tablet Take 1 tablet by mouth Daily. 90 tablet 1   • atorvastatin (LIPITOR) 40 MG tablet TAKE ONE TABLET BY MOUTH DAILY 90 tablet 1   • levothyroxine (SYNTHROID, LEVOTHROID) 50 MCG tablet Take 1 tablet by mouth Daily. Take at least 30 minutes before having breakfast with a sip of water. For low thyroid. 90 tablet 1   • PARoxetine (PAXIL) 10 MG tablet Take 1 tablet by mouth Daily. 90 tablet 3        Past Medical History:   Diagnosis Date   • Anxiety and depression    • Hyperlipidemia    • Hypertension 2022   • Hypothyroidism        OBJECTIVE    Vital Signs:   /70   Pulse 59   Ht 152.4 cm (60\")   Wt 40.8 kg (90 lb)   SpO2 97%   BMI 17.58 kg/m²     Physical Exam  Vitals reviewed.   Constitutional:       General: She is not in acute distress.     Appearance: Normal appearance. She is not ill-appearing.   HENT:      Head: Normocephalic and atraumatic.   Eyes:      General: No scleral " icterus.  Cardiovascular:      Rate and Rhythm: Normal rate and regular rhythm.      Heart sounds: Normal heart sounds. No murmur heard.  Pulmonary:      Effort: Pulmonary effort is normal. No respiratory distress.      Breath sounds: Normal breath sounds. No stridor. No wheezing or rhonchi.   Musculoskeletal:      Right lower leg: No edema.      Left lower leg: No edema.      Comments: Decreased cervical ROM in all planes without overlying skin change or deformity/trauma. No tenderness to the cervical spine or paraspinal muscles with palpation.    Skin:     Coloration: Skin is not jaundiced.   Neurological:      Mental Status: She is alert.   Psychiatric:         Mood and Affect: Mood normal.         Behavior: Behavior normal.         Thought Content: Thought content normal.            The following data was reviewed by: Brandin Rodriguez DO on 05/10/2023:    Data reviewed: Radiologic studies     RADIOLOGY REPORT     [facility]   UNIT/AGE/GENDER: M.RAD  OP      AGE:69 Y          SEX:F   PATIENT NAME/:  EASTON LIZAMA R    1950   UNIT NUMBER:  [id number]   ACCOUNT NUMBER:  [id number]   ACCESSION NUMBER:  [id number]     EXAMINATION(S): XR C SPINE AP AND LAT     DATE: 2020     HISTORY: Pain . Chronic cervicalgia.     COMPARISON: None     FINDING(S): 2 views of the cervical spine was/were submitted for review. There is degenerative retrolisthesis at C5-C6 and C6-C7. Moderate discogenic degenerative changes are seen at those levels.     Milder changes are seen at C4-C5. There is moderate mid to lower cervical spine facet osteoarthrosis. No fractures are noted. There is advanced atlantodental interval osteoarthritis is.     There is no prevertebral soft tissue edema. Carotid artery calcifications are seen on the left.     IMPRESSION:     1.Moderate discogenic degenerative change at C5-C6 and C6-C7   2.Moderate mid to lower cervical spine facet osteoarthrosis              ASSESSMENT & PLAN     Diagnoses and all orders for this visit:    1. Essential hypertension (Primary)  - taking amlodipine 2.5 mg daily. Doesn't check blood pressure at home.   -BP with acceptable control in office at 130/70  -continue current reigmen  -recheck renal function and electrolytes today      2. Hypothyroidism, unspecified type  - takes levothyroxine 50 mcg daily; denies having any thyroid nodules/cancer   -recheck annual TSH today to ensure adequate dosing  -     TSH Rfx On Abnormal To Free T4    3. DDD (degenerative disc disease), cervical  -deals with cervical spine pain worse in the morning when waking up   -I reviewed 2020 xray of the C-spine as above which showed moderate degenerative cervical changes and c-spine facet osteoarthrosis   -recommended physical therapy which she accepted, will place referral   -physical exam findings as above  -recommend she use Tylenol arthritis two pills three times daily as needed , recommended increased usage for symptomatic relief   -if no improvement with PT can consider repeat imaging        The following social determinates of health impact the patient's medical decision making: No social determinates of health were factored in to today's visit.     Follow Up  Return in about 6 months (around 11/10/2023) for Medicare Wellness.    Patient/family had no further questions at this time and verbalized understanding of the plan discussed today.

## 2023-05-11 DIAGNOSIS — E03.9 HYPOTHYROIDISM, UNSPECIFIED TYPE: Primary | ICD-10-CM

## 2023-05-11 LAB
ALBUMIN SERPL-MCNC: 4.6 G/DL (ref 3.5–5.2)
ALBUMIN/GLOB SERPL: 1.9 G/DL
ALP SERPL-CCNC: 89 U/L (ref 39–117)
ALT SERPL-CCNC: 30 U/L (ref 1–33)
AST SERPL-CCNC: 17 U/L (ref 1–32)
BASOPHILS # BLD AUTO: 0.03 10*3/MM3 (ref 0–0.2)
BASOPHILS NFR BLD AUTO: 0.5 % (ref 0–1.5)
BILIRUB SERPL-MCNC: 0.7 MG/DL (ref 0–1.2)
BUN SERPL-MCNC: 21 MG/DL (ref 8–23)
BUN/CREAT SERPL: 28 (ref 7–25)
CALCIUM SERPL-MCNC: 9.7 MG/DL (ref 8.6–10.5)
CHLORIDE SERPL-SCNC: 107 MMOL/L (ref 98–107)
CO2 SERPL-SCNC: 26.9 MMOL/L (ref 22–29)
CREAT SERPL-MCNC: 0.75 MG/DL (ref 0.57–1)
EGFRCR SERPLBLD CKD-EPI 2021: 84.7 ML/MIN/1.73
EOSINOPHIL # BLD AUTO: 0.2 10*3/MM3 (ref 0–0.4)
EOSINOPHIL NFR BLD AUTO: 3.3 % (ref 0.3–6.2)
ERYTHROCYTE [DISTWIDTH] IN BLOOD BY AUTOMATED COUNT: 12.8 % (ref 12.3–15.4)
GLOBULIN SER CALC-MCNC: 2.4 GM/DL
GLUCOSE SERPL-MCNC: 103 MG/DL (ref 65–99)
HCT VFR BLD AUTO: 42.3 % (ref 34–46.6)
HGB BLD-MCNC: 14 G/DL (ref 12–15.9)
IMM GRANULOCYTES # BLD AUTO: 0.01 10*3/MM3 (ref 0–0.05)
IMM GRANULOCYTES NFR BLD AUTO: 0.2 % (ref 0–0.5)
LYMPHOCYTES # BLD AUTO: 1.46 10*3/MM3 (ref 0.7–3.1)
LYMPHOCYTES NFR BLD AUTO: 24.3 % (ref 19.6–45.3)
MCH RBC QN AUTO: 30.8 PG (ref 26.6–33)
MCHC RBC AUTO-ENTMCNC: 33.1 G/DL (ref 31.5–35.7)
MCV RBC AUTO: 93 FL (ref 79–97)
MONOCYTES # BLD AUTO: 0.49 10*3/MM3 (ref 0.1–0.9)
MONOCYTES NFR BLD AUTO: 8.1 % (ref 5–12)
NEUTROPHILS # BLD AUTO: 3.83 10*3/MM3 (ref 1.7–7)
NEUTROPHILS NFR BLD AUTO: 63.6 % (ref 42.7–76)
NRBC BLD AUTO-RTO: 0 /100 WBC (ref 0–0.2)
PLATELET # BLD AUTO: 265 10*3/MM3 (ref 140–450)
POTASSIUM SERPL-SCNC: 4.2 MMOL/L (ref 3.5–5.2)
PROT SERPL-MCNC: 7 G/DL (ref 6–8.5)
RBC # BLD AUTO: 4.55 10*6/MM3 (ref 3.77–5.28)
SODIUM SERPL-SCNC: 143 MMOL/L (ref 136–145)
TSH SERPL DL<=0.005 MIU/L-ACNC: 0.89 UIU/ML (ref 0.27–4.2)
WBC # BLD AUTO: 6.02 10*3/MM3 (ref 3.4–10.8)

## 2023-05-11 RX ORDER — LEVOTHYROXINE SODIUM 0.05 MG/1
50 TABLET ORAL DAILY
Qty: 90 TABLET | Refills: 3 | Status: SHIPPED | OUTPATIENT
Start: 2023-05-11

## 2023-08-07 DIAGNOSIS — F41.8 MIXED ANXIETY DEPRESSIVE DISORDER: ICD-10-CM

## 2023-08-07 DIAGNOSIS — I10 ESSENTIAL HYPERTENSION: ICD-10-CM

## 2023-08-07 RX ORDER — AMLODIPINE BESYLATE 2.5 MG/1
TABLET ORAL
Qty: 90 TABLET | Refills: 1 | Status: SHIPPED | OUTPATIENT
Start: 2023-08-07

## 2023-08-08 RX ORDER — PAROXETINE 10 MG/1
TABLET, FILM COATED ORAL
Qty: 90 TABLET | Refills: 1 | Status: SHIPPED | OUTPATIENT
Start: 2023-08-08

## 2023-09-20 DIAGNOSIS — E78.00 PURE HYPERCHOLESTEROLEMIA: ICD-10-CM

## 2023-09-20 RX ORDER — ATORVASTATIN CALCIUM 40 MG/1
40 TABLET, FILM COATED ORAL DAILY
Qty: 90 TABLET | Refills: 1 | Status: SHIPPED | OUTPATIENT
Start: 2023-09-20

## 2024-01-11 ENCOUNTER — OFFICE VISIT (OUTPATIENT)
Dept: INTERNAL MEDICINE | Facility: CLINIC | Age: 74
End: 2024-01-11
Payer: MEDICARE

## 2024-01-11 VITALS
BODY MASS INDEX: 18.46 KG/M2 | DIASTOLIC BLOOD PRESSURE: 70 MMHG | HEART RATE: 68 BPM | HEIGHT: 60 IN | OXYGEN SATURATION: 98 % | WEIGHT: 94 LBS | SYSTOLIC BLOOD PRESSURE: 128 MMHG

## 2024-01-11 DIAGNOSIS — Z23 NEED FOR COVID-19 VACCINE: ICD-10-CM

## 2024-01-11 DIAGNOSIS — R68.89 ABNORMAL ANKLE BRACHIAL INDEX (ABI): ICD-10-CM

## 2024-01-11 DIAGNOSIS — R42 LIGHTHEADEDNESS: ICD-10-CM

## 2024-01-11 DIAGNOSIS — Z78.0 POST-MENOPAUSAL: ICD-10-CM

## 2024-01-11 DIAGNOSIS — Z12.31 ENCOUNTER FOR SCREENING MAMMOGRAM FOR MALIGNANT NEOPLASM OF BREAST: ICD-10-CM

## 2024-01-11 DIAGNOSIS — Z00.00 MEDICARE ANNUAL WELLNESS VISIT, SUBSEQUENT: Primary | ICD-10-CM

## 2024-01-11 DIAGNOSIS — Z12.11 COLON CANCER SCREENING: ICD-10-CM

## 2024-01-11 NOTE — PROGRESS NOTES
"The ABCs of the Annual Wellness Visit  Subsequent Medicare Wellness Visit    Subjective      Nereyda Estrella is a 73 y.o. female who presents for a Subsequent Medicare Wellness Visit.    The following portions of the patient's history were reviewed and   updated as appropriate: allergies, current medications, past family history, past medical history, past social history, past surgical history, and problem list.    Anxiety and depression: takes paroxetine 10 mg daily. States this medication helps her a lot.     HLD: takes atorvastatin 40 mg daily for primary prevention.     DDD (degenerative disc disease)    Hypothyroidism: takes levothyroxine 50 mcg daily; denies having any thyroid nodules/cancer      HTN: taking amlodipine 2.5 mg daily. Doesn't check blood pressure at home.     She has been feeling lightheadedness \"slight\" that lasts a few seconds. This started last month. This happens a few times per month. It comes on while standing and doing chores at work.     Compared to one year ago, the patient feels her physical   health is the same.    Compared to one year ago, the patient feels her mental   health is the same. \"It's ok, I can cope and it goes well. I'm more resilient than I thought I would be\"    Recent Hospitalizations:  She was not admitted to the hospital during the last year.       Current Medical Providers:  Patient Care Team:  Brandin Rodriguez DO as PCP - General (Internal Medicine)  Ralph Hayden Jr., MD as Consulting Physician (Family Medicine)    Outpatient Medications Prior to Visit   Medication Sig Dispense Refill    amLODIPine (NORVASC) 2.5 MG tablet TAKE ONE TABLET BY MOUTH DAILY 90 tablet 1    atorvastatin (LIPITOR) 40 MG tablet Take 1 tablet by mouth Daily. 90 tablet 1    levothyroxine (SYNTHROID, LEVOTHROID) 50 MCG tablet Take 1 tablet by mouth Daily. Take at least 30 minutes before having breakfast with a sip of water. For low thyroid. 90 tablet 3    PARoxetine (PAXIL) 10 MG tablet TAKE " "ONE TABLET BY MOUTH DAILY 90 tablet 1     No facility-administered medications prior to visit.       No opioid medication identified on active medication list. I have reviewed chart for other potential  high risk medication/s and harmful drug interactions in the elderly.        Aspirin is not on active medication list.  Aspirin use is not indicated based on review of current medical condition/s. Risk of harm outweighs potential benefits.  .    Patient Active Problem List   Diagnosis    Furuncle    Hypothyroidism (acquired)     Advance Care Planning   Advance Care Planning     Advance Directive is not on file.  ACP discussion was held with the patient during this visit. Patient does not have an advance directive, information provided.     Objective    Vitals:    01/11/24 1553   BP: 128/70   Pulse: 68   SpO2: 98%   Weight: 42.6 kg (94 lb)   Height: 152.4 cm (60\")     Physical Exam  Vitals reviewed.   Constitutional:       General: She is not in acute distress.     Appearance: She is not ill-appearing.      Comments: Slightly underweight   HENT:      Head: Atraumatic.   Eyes:      General: No scleral icterus.  Cardiovascular:      Rate and Rhythm: Normal rate and regular rhythm.      Pulses:           Dorsalis pedis pulses are 2+ on the right side and 2+ on the left side.        Posterior tibial pulses are 2+ on the right side and 2+ on the left side.      Heart sounds: Normal heart sounds. No murmur heard.     Comments: No carotid bruit b/l  Pulmonary:      Effort: Pulmonary effort is normal. No respiratory distress.      Breath sounds: Normal breath sounds. No stridor. No wheezing or rhonchi.   Musculoskeletal:      Right lower leg: No edema.      Left lower leg: No edema.   Skin:     Coloration: Skin is not jaundiced.   Neurological:      Mental Status: She is alert.   Psychiatric:         Mood and Affect: Mood normal.         Behavior: Behavior normal.         Thought Content: Thought content normal.           ECG " "12 Lead    Date/Time: 2024 4:47 PM  Performed by: Brandin Rodriguez DO    Authorized by: Brandin Rodriguez DO  Previous ECG: no previous ECG available  Rhythm: sinus bradycardia  Rate: bradycardic  Rate comments: 55  Conduction: conduction normal  ST Segments: ST segments normal  T Waves: T waves normal  QRS axis: normal  Other: no other findings    Clinical impression: non-specific ECG          Estimated body mass index is 18.36 kg/m² as calculated from the following:    Height as of this encounter: 152.4 cm (60\").    Weight as of this encounter: 42.6 kg (94 lb).    BMI is below normal parameters (malnutrition). Recommendations: Information on healthy weight added to patient's after visit summary      Does the patient have evidence of cognitive impairment?   No            HEALTH RISK ASSESSMENT    Smoking Status:  Social History     Tobacco Use   Smoking Status Former    Packs/day: .1    Types: Cigarettes   Smokeless Tobacco Never   Tobacco Comments    minimal cigarette use in college     Alcohol Consumption:  Social History     Substance and Sexual Activity   Alcohol Use Yes    Alcohol/week: 4.0 - 5.0 standard drinks of alcohol    Types: 4 - 5 Glasses of wine per week     Fall Risk Screen:    STEADI Fall Risk Assessment was completed, and patient is at LOW risk for falls.Assessment completed on:2024    Depression Screenin/11/2024     4:00 PM   PHQ-2/PHQ-9 Depression Screening   Little Interest or Pleasure in Doing Things 0-->not at all   Feeling Down, Depressed or Hopeless 0-->not at all   PHQ-9: Brief Depression Severity Measure Score 0       Health Habits and Functional and Cognitive Screenin/11/2024     3:56 PM   Functional & Cognitive Status   Do you have difficulty preparing food and eating? No   Do you have difficulty bathing yourself, getting dressed or grooming yourself? No   Do you have difficulty using the toilet? No   Do you have difficulty moving around from place to place? No   Do " you have trouble with steps or getting out of a bed or a chair? No   Current Diet Well Balanced Diet        Current Diet Comment healthy        Dental Exam Comment Denture   Eye Exam Not up to date   Exercise (times per week) 2 times per week   Current Exercises Include Walking        Exercise Comment yoga   Do you need help using the phone?  No   Are you deaf or do you have serious difficulty hearing?  No   Do you need help to go to places out of walking distance? No   Do you need help shopping? No   Do you need help preparing meals?  No   Do you need help with housework?  No   Do you need help with laundry? No   Do you need help taking your medications? No   Do you need help managing money? No   Do you ever drive or ride in a car without wearing a seat belt? No   Have you felt unusual stress, anger or loneliness in the last month? Yes   Who do you live with? Spouse   If you need help, do you have trouble finding someone available to you? No   Do you have difficulty concentrating, remembering or making decisions? No       Age-appropriate Screening Schedule:  Refer to the list below for future screening recommendations based on patient's age, sex and/or medical conditions. Orders for these recommended tests are listed in the plan section. The patient has been provided with a written plan.    Health Maintenance   Topic Date Due    DXA SCAN  Never done    TDAP/TD VACCINES (1 - Tdap) Never done    ZOSTER VACCINE (1 of 2) Never done    Pneumococcal Vaccine 65+ (1 of 1 - PCV) Never done    INFLUENZA VACCINE  Never done    COVID-19 Vaccine (5 - 2023-24 season) 09/01/2023    COLORECTAL CANCER SCREENING  10/08/2023    LIPID PANEL  11/03/2023    MAMMOGRAM  08/26/2024    ANNUAL WELLNESS VISIT  01/11/2025    BMI FOLLOWUP  01/11/2025    HEPATITIS C SCREENING  Completed                  CMS Preventative Services Quick Reference  Risk Factors Identified During Encounter:    Immunizations Discussed/Encouraged: Influenza, Prevnar  "20 (Pneumococcal 20-valent conjugate), Shingrix, and COVID19  Dental Screening Recommended  Vision Screening Recommended    *updated mammogram ordered  *updated cologuard orders  *bone density again ordered for pt- she did not go to her last bone density appointment and has never had a bone density test. Discussed the importance of identifying and treating low bone density to reduce risk of fractures     The above risks/problems have been discussed with the patient.  Pertinent information has been shared with the patient in the After Visit Summary.    Follow Up:   Next Medicare Wellness visit to be scheduled in 1 year.      An After Visit Summary and PPPS were made available to the patient.    A problem-based visit was also conducted on the same day, see below for assessment and plan    Diagnoses and all orders for this visit:    1. Abnormal ankle brachial index (MANJIT) (Primary)  - Patient had vascular screening completed by her insurance company at her home on 7/12/2023.  I reviewed the results of this.  Her MANJIT on the right was decreased at 0.53 and her MANJIT on the left was decreased at 0.49.  Dorsalis pedis and posterior tibial pulses bilaterally are 2+.  -At this point I would like for the patient to see vascular for further evaluation of the abnormal MANJIT. She is already on statin therapy.   -     Ambulatory Referral to Vascular Surgery    2. Lightheadedness  -She has been feeling lightheadedness \"slight\" that lasts a few seconds. This started last month. This happens a few times per month. It comes on while standing and doing chores at work.   -/70 in office today  -exam reveals   -EKG in office with sinus bradycardia  -I will order TTE to eval for any valvular conditions as well as order a carotid duplex to screen for carotid artery stenosis due to the fact she also has HLD, previous smoking history and abnormal ABIs   -     Adult Transthoracic Echo Complete W/ Cont if Necessary Per Protocol  -     ECG 12 " Lead  -     Duplex Carotid Ultrasound CAR            The following social determinates of health impact the patient's medical decision making: No social determinates of health were factored in to today's visit.     Follow Up  Return in about 6 months (around 7/11/2024) for Annual physical.

## 2024-01-12 ENCOUNTER — TELEPHONE (OUTPATIENT)
Dept: INTERNAL MEDICINE | Facility: CLINIC | Age: 74
End: 2024-01-12
Payer: MEDICARE

## 2024-01-12 NOTE — TELEPHONE ENCOUNTER
Carotid duplex and echocardiogram ordered. Will [facility] take care of these PAs needed for those procedures? Thanks

## 2024-01-12 NOTE — TELEPHONE ENCOUNTER
Caller: Nereyda Estrella    Relationship: Self    Best call back number: [phone]     What was the call regarding: PATIENT STATED THAT FOR ALL TESTS ORDERED BY DR MONROY, HER INSURANCE [employer] IS REQUIRING A PRIOR AUTHORIZATION. PATIENT STATED AFTER THEY RECEIVE THE LETTERS, THEY WILL DECIDE IF THEY ARE APPROVED OR NOT. PLEASE ADVISE.

## 2024-01-15 NOTE — TELEPHONE ENCOUNTER
Once [facility] schedules the patient for testing or even if it's [facility] their offices will send to [facility] pre-auth department to go through the auth process. Our office does not get prior auths.

## 2024-01-24 ENCOUNTER — HOSPITAL ENCOUNTER (OUTPATIENT)
Dept: CARDIOLOGY | Facility: HOSPITAL | Age: 74
Discharge: HOME | End: 2024-01-24
Admitting: STUDENT IN AN ORGANIZED HEALTH CARE EDUCATION/TRAINING PROGRAM
Payer: MEDICARE

## 2024-01-24 LAB
BH CV XLRA MEAS LEFT DIST CCA EDV: -15.3 CM/SEC
BH CV XLRA MEAS LEFT DIST CCA PSV: -67.2 CM/SEC
BH CV XLRA MEAS LEFT DIST ICA EDV: -37.5 CM/SEC
BH CV XLRA MEAS LEFT DIST ICA PSV: -82.1 CM/SEC
BH CV XLRA MEAS LEFT ICA/CCA RATIO: 1.65
BH CV XLRA MEAS LEFT MID ICA EDV: -35.2 CM/SEC
BH CV XLRA MEAS LEFT MID ICA PSV: -111 CM/SEC
BH CV XLRA MEAS LEFT PROX CCA EDV: -18.1 CM/SEC
BH CV XLRA MEAS LEFT PROX CCA PSV: -75.4 CM/SEC
BH CV XLRA MEAS LEFT PROX ECA EDV: -6.3 CM/SEC
BH CV XLRA MEAS LEFT PROX ECA PSV: -62.1 CM/SEC
BH CV XLRA MEAS LEFT PROX ICA EDV: 32.8 CM/SEC
BH CV XLRA MEAS LEFT PROX ICA PSV: 93.8 CM/SEC
BH CV XLRA MEAS LEFT PROX SCLA PSV: 162 CM/SEC
BH CV XLRA MEAS LEFT VERTEBRAL A EDV: -14.7 CM/SEC
BH CV XLRA MEAS LEFT VERTEBRAL A PSV: -85.6 CM/SEC
BH CV XLRA MEAS RIGHT DIST CCA EDV: -17.7 CM/SEC
BH CV XLRA MEAS RIGHT DIST CCA PSV: -70.3 CM/SEC
BH CV XLRA MEAS RIGHT DIST ICA EDV: -28.1 CM/SEC
BH CV XLRA MEAS RIGHT DIST ICA PSV: -92 CM/SEC
BH CV XLRA MEAS RIGHT ICA/CCA RATIO: 1.31
BH CV XLRA MEAS RIGHT MID ICA EDV: -20.8 CM/SEC
BH CV XLRA MEAS RIGHT MID ICA PSV: -70.3 CM/SEC
BH CV XLRA MEAS RIGHT PROX CCA EDV: -10.2 CM/SEC
BH CV XLRA MEAS RIGHT PROX CCA PSV: -58.1 CM/SEC
BH CV XLRA MEAS RIGHT PROX ECA EDV: -9.8 CM/SEC
BH CV XLRA MEAS RIGHT PROX ECA PSV: -77.8 CM/SEC
BH CV XLRA MEAS RIGHT PROX ICA EDV: -23.2 CM/SEC
BH CV XLRA MEAS RIGHT PROX ICA PSV: -64.4 CM/SEC
BH CV XLRA MEAS RIGHT PROX SCLA PSV: 179 CM/SEC
BH CV XLRA MEAS RIGHT VERTEBRAL A EDV: -18.5 CM/SEC
BH CV XLRA MEAS RIGHT VERTEBRAL A PSV: -58.9 CM/SEC

## 2024-01-24 PROCEDURE — 93880 EXTRACRANIAL BILAT STUDY: CPT

## 2024-01-25 ENCOUNTER — TELEPHONE (OUTPATIENT)
Dept: INTERNAL MEDICINE | Facility: CLINIC | Age: 74
End: 2024-01-25
Payer: MEDICARE

## 2024-01-25 NOTE — TELEPHONE ENCOUNTER
Caller: Nereyda Estrella    Relationship: Self    Best call back number: [phone]     What was the call regarding: PATIENT WAS TOLD TO TAKE ASPRIN BASED ON HER PREVIOUS TEST RESULTS BUT SHE STATES SHE IS ALLERGIC TO THE MEDICATION AND DOES NOT WANT TO TAKE THE CHANCE ON TAKING IT. PATIENT WOULD LIKE TO KNOW WHAT THE ALTERNATIVE IS. PLEASE CALL AND ADVISE     PLEASE CALL AND ADVISE

## 2024-01-25 NOTE — TELEPHONE ENCOUNTER
Patient called requesting to talk to Dr. Rodriguez about the results of her Duplex Carotid. She states she has more questions. Please call her at [phone].

## 2024-01-25 NOTE — TELEPHONE ENCOUNTER
At this point I do not recommend any alternatives. She can discuss with the vascular specialist that I referred her to to determine if any other options are needed.

## 2024-01-25 NOTE — TELEPHONE ENCOUNTER
Please let pt know there is plaque in the arteries of the neck but it is not severe . She is already on aspirin and cholesterol medication which is the treatment. At this point no other treatment is needed. If she has any other concerns I recommend making a video visit appointment.

## 2024-01-26 ENCOUNTER — TELEPHONE (OUTPATIENT)
Dept: INTERNAL MEDICINE | Facility: CLINIC | Age: 74
End: 2024-01-26
Payer: MEDICARE

## 2024-01-26 NOTE — TELEPHONE ENCOUNTER
Patient called and stated that she saw the results of her vascular imaging in [facility], but she wants to know if she needs to do anything else.     Best call back # [phone]

## 2024-01-31 ENCOUNTER — HOSPITAL ENCOUNTER (OUTPATIENT)
Dept: CARDIOLOGY | Facility: HOSPITAL | Age: 74
Discharge: HOME | End: 2024-01-31
Admitting: STUDENT IN AN ORGANIZED HEALTH CARE EDUCATION/TRAINING PROGRAM
Payer: MEDICARE

## 2024-01-31 VITALS
DIASTOLIC BLOOD PRESSURE: 60 MMHG | SYSTOLIC BLOOD PRESSURE: 110 MMHG | HEIGHT: 60 IN | BODY MASS INDEX: 18.44 KG/M2 | HEART RATE: 60 BPM | WEIGHT: 93.92 LBS

## 2024-01-31 LAB
AORTIC ARCH: 2.7 CM
AORTIC DIMENSIONLESS INDEX: 0.9 (DI)
ASCENDING AORTA: 2.2 CM
BH CV ECHO MEAS - ACS: 1.6 CM
BH CV ECHO MEAS - AO MAX PG: 5.2 MMHG
BH CV ECHO MEAS - AO MEAN PG: 3 MMHG
BH CV ECHO MEAS - AO ROOT DIAM: 2.13 CM
BH CV ECHO MEAS - AO V2 MAX: 114 CM/SEC
BH CV ECHO MEAS - AO V2 VTI: 29.6 CM
BH CV ECHO MEAS - AVA(I,D): 2.6 CM2
BH CV ECHO MEAS - EDV(CUBED): 74.1 ML
BH CV ECHO MEAS - EDV(MOD-SP2): 46 ML
BH CV ECHO MEAS - EDV(MOD-SP4): 47 ML
BH CV ECHO MEAS - EF(MOD-BP): 74.1 %
BH CV ECHO MEAS - EF(MOD-SP2): 73.9 %
BH CV ECHO MEAS - EF(MOD-SP4): 72.3 %
BH CV ECHO MEAS - EF_3D-VOL: 62 %
BH CV ECHO MEAS - ESV(CUBED): 8.1 ML
BH CV ECHO MEAS - ESV(MOD-SP2): 12 ML
BH CV ECHO MEAS - ESV(MOD-SP4): 13 ML
BH CV ECHO MEAS - FS: 52.2 %
BH CV ECHO MEAS - IVS/LVPW: 1.11 CM
BH CV ECHO MEAS - IVSD: 1 CM
BH CV ECHO MEAS - LA 3D VOL INDEX: 32
BH CV ECHO MEAS - LAT PEAK E' VEL: 9.5 CM/SEC
BH CV ECHO MEAS - LV DIASTOLIC VOL/BSA (35-75): 34.7 CM2
BH CV ECHO MEAS - LV MASS(C)D: 127.8 GRAMS
BH CV ECHO MEAS - LV MAX PG: 4.3 MMHG
BH CV ECHO MEAS - LV MEAN PG: 2 MMHG
BH CV ECHO MEAS - LV SYSTOLIC VOL/BSA (12-30): 9.6 CM2
BH CV ECHO MEAS - LV V1 MAX: 104 CM/SEC
BH CV ECHO MEAS - LV V1 VTI: 27.9 CM
BH CV ECHO MEAS - LVIDD: 4.2 CM
BH CV ECHO MEAS - LVIDS: 2.01 CM
BH CV ECHO MEAS - LVOT AREA: 2.7 CM2
BH CV ECHO MEAS - LVOT DIAM: 1.86 CM
BH CV ECHO MEAS - LVPWD: 0.9 CM
BH CV ECHO MEAS - MED PEAK E' VEL: 9.5 CM/SEC
BH CV ECHO MEAS - MV A DUR: 0.14 SEC
BH CV ECHO MEAS - MV A MAX VEL: 79.7 CM/SEC
BH CV ECHO MEAS - MV DEC SLOPE: 298.3 CM/SEC2
BH CV ECHO MEAS - MV DEC TIME: 0.16 SEC
BH CV ECHO MEAS - MV E MAX VEL: 82.3 CM/SEC
BH CV ECHO MEAS - MV E/A: 1.03
BH CV ECHO MEAS - MV MAX PG: 3.2 MMHG
BH CV ECHO MEAS - MV MEAN PG: 0.96 MMHG
BH CV ECHO MEAS - MV P1/2T: 89.3 MSEC
BH CV ECHO MEAS - MV V2 VTI: 35.3 CM
BH CV ECHO MEAS - MVA(P1/2T): 2.46 CM2
BH CV ECHO MEAS - MVA(VTI): 2.15 CM2
BH CV ECHO MEAS - PA ACC TIME: 0.19 SEC
BH CV ECHO MEAS - PA V2 MAX: 82.5 CM/SEC
BH CV ECHO MEAS - PULM A REVS DUR: 0.24 SEC
BH CV ECHO MEAS - PULM A REVS VEL: 33 CM/SEC
BH CV ECHO MEAS - PULM DIAS VEL: 31.3 CM/SEC
BH CV ECHO MEAS - PULM S/D: 1.45
BH CV ECHO MEAS - PULM SYS VEL: 45.4 CM/SEC
BH CV ECHO MEAS - QP/QS: 0.57
BH CV ECHO MEAS - RAP SYSTOLE: 3 MMHG
BH CV ECHO MEAS - RV MAX PG: 1.79 MMHG
BH CV ECHO MEAS - RV V1 MAX: 66.8 CM/SEC
BH CV ECHO MEAS - RV V1 VTI: 18.3 CM
BH CV ECHO MEAS - RVOT DIAM: 1.74 CM
BH CV ECHO MEAS - RVSP: 24.3 MMHG
BH CV ECHO MEAS - SI(MOD-SP2): 25.1 ML/M2
BH CV ECHO MEAS - SI(MOD-SP4): 25.1 ML/M2
BH CV ECHO MEAS - SUP REN AO DIAM: 1.8 CM
BH CV ECHO MEAS - SV(LVOT): 76.1 ML
BH CV ECHO MEAS - SV(MOD-SP2): 34 ML
BH CV ECHO MEAS - SV(MOD-SP4): 34 ML
BH CV ECHO MEAS - SV(RVOT): 43.3 ML
BH CV ECHO MEAS - TAPSE (>1.6): 2.15 CM
BH CV ECHO MEAS - TR MAX PG: 21.3 MMHG
BH CV ECHO MEAS - TR MAX VEL: 230.8 CM/SEC
BH CV ECHO MEASUREMENTS AVERAGE E/E' RATIO: 8.66
BH CV XLRA - RV BASE: 2.41 CM
BH CV XLRA - RV LENGTH: 6.3 CM
BH CV XLRA - RV MID: 2.16 CM
BH CV XLRA - TDI S': 13.6 CM/SEC
LEFT ATRIUM VOLUME INDEX: 24.3 ML/M2
SINUS: 2.23 CM
STJ: 2.17 CM

## 2024-01-31 PROCEDURE — 93306 TTE W/DOPPLER COMPLETE: CPT

## 2024-02-05 DIAGNOSIS — F41.8 MIXED ANXIETY DEPRESSIVE DISORDER: ICD-10-CM

## 2024-02-05 RX ORDER — PAROXETINE 10 MG/1
10 TABLET, FILM COATED ORAL DAILY
Qty: 90 TABLET | Refills: 1 | Status: SHIPPED | OUTPATIENT
Start: 2024-02-05 | End: 2024-02-05 | Stop reason: SDUPTHER

## 2024-02-05 NOTE — TELEPHONE ENCOUNTER
Caller: Nereyda Estrella    Relationship: Self    Best call back number: [phone]    Requested Prescriptions:   Requested Prescriptions     Pending Prescriptions Disp Refills    PARoxetine (PAXIL) 10 MG tablet 90 tablet 1     Sig: Take 1 tablet by mouth Daily.        Pharmacy where request should be sent: [facility] [id number] [facility] [address] AT [facility] - [phone]  - [phone] FX     Last office visit with prescribing clinician: 1/11/2024   Last telemedicine visit with prescribing clinician: Visit date not found   Next office visit with prescribing clinician: 7/11/2024     Would you like a call back once the refill request has been completed: [] Yes [x] No    If the office needs to give you a call back, can they leave a voicemail: [] Yes [x] No    Dany Encarnacion Rep   02/05/24 15:16 EST

## 2024-02-06 RX ORDER — PAROXETINE 10 MG/1
10 TABLET, FILM COATED ORAL DAILY
Qty: 90 TABLET | Refills: 1 | Status: SHIPPED | OUTPATIENT
Start: 2024-02-06

## 2024-02-12 DIAGNOSIS — I10 ESSENTIAL HYPERTENSION: ICD-10-CM

## 2024-02-12 RX ORDER — AMLODIPINE BESYLATE 2.5 MG/1
2.5 TABLET ORAL DAILY
Qty: 90 TABLET | Refills: 1 | Status: SHIPPED | OUTPATIENT
Start: 2024-02-12

## 2024-03-14 ENCOUNTER — HOSPITAL ENCOUNTER (OUTPATIENT)
Dept: PET IMAGING | Facility: HOSPITAL | Age: 74
Discharge: HOME | End: 2024-03-14
Payer: MEDICARE

## 2024-03-14 ENCOUNTER — APPOINTMENT (OUTPATIENT)
Dept: WOMENS IMAGING | Facility: HOSPITAL | Age: 74
End: 2024-03-14
Payer: MEDICARE

## 2024-03-14 PROCEDURE — 77067 SCR MAMMO BI INCL CAD: CPT | Performed by: RADIOLOGY

## 2024-03-14 PROCEDURE — 77063 BREAST TOMOSYNTHESIS BI: CPT | Performed by: RADIOLOGY

## 2024-03-14 PROCEDURE — 77080 DXA BONE DENSITY AXIAL: CPT

## 2024-03-21 ENCOUNTER — OFFICE VISIT (OUTPATIENT)
Dept: INTERNAL MEDICINE | Facility: CLINIC | Age: 74
End: 2024-03-21
Payer: MEDICARE

## 2024-03-21 VITALS
HEART RATE: 72 BPM | SYSTOLIC BLOOD PRESSURE: 114 MMHG | WEIGHT: 95 LBS | HEIGHT: 60 IN | BODY MASS INDEX: 18.65 KG/M2 | OXYGEN SATURATION: 97 % | DIASTOLIC BLOOD PRESSURE: 68 MMHG

## 2024-03-21 DIAGNOSIS — M81.6 LOCALIZED OSTEOPOROSIS WITHOUT CURRENT PATHOLOGICAL FRACTURE: Primary | ICD-10-CM

## 2024-03-21 RX ORDER — ALENDRONATE SODIUM 70 MG/1
70 TABLET ORAL
Qty: 12 TABLET | Refills: 1 | Status: SHIPPED | OUTPATIENT
Start: 2024-03-21

## 2024-03-21 NOTE — PROGRESS NOTES
"  Brandin Rodriguez D.O.  Internal Medicine  [facility]  [address], [address]  [place] [address]  [phone]      Chief Complaint  Follow-up (Bone density)    SUBJECTIVE    History of Present Illness    Nereyda Estrella is a 73 y.o. female who presents to the office today as an established patient that last saw me on 1/11/2024.     Osteoporosis: new diagnosis since last visit. No bone fracture history. For exercise she does bending exercises and stretching. No walking. She drinks 1/2 glass sparkling wine nightly. She takes calcium and vitamin D every day.     Allergies   Allergen Reactions    Aspirin Swelling        Outpatient Medications Marked as Taking for the 3/21/24 encounter (Office Visit) with Brandin Rodriguez, DO   Medication Sig Dispense Refill    amLODIPine (NORVASC) 2.5 MG tablet Take 1 tablet by mouth Daily. 90 tablet 1    atorvastatin (LIPITOR) 40 MG tablet Take 1 tablet by mouth Daily. 90 tablet 1    levothyroxine (SYNTHROID, LEVOTHROID) 50 MCG tablet Take 1 tablet by mouth Daily. Take at least 30 minutes before having breakfast with a sip of water. For low thyroid. 90 tablet 3    PARoxetine (PAXIL) 10 MG tablet Take 1 tablet by mouth Daily. 90 tablet 1        Past Medical History:   Diagnosis Date    Abnormal ankle brachial index (MANJIT)     Anxiety and depression     Carotid artery stenosis     Hyperlipidemia     Hypertension 2022    Hypothyroidism     Osteoporosis        OBJECTIVE    Vital Signs:   /68   Pulse 72   Ht 152.4 cm (60\")   Wt 43.1 kg (95 lb)   SpO2 97%   BMI 18.55 kg/m²        Physical Exam  Vitals reviewed.   Constitutional:       General: She is not in acute distress.     Appearance: She is normal weight. She is not ill-appearing.   Eyes:      General: No scleral icterus.  Pulmonary:      Effort: Pulmonary effort is normal. No respiratory distress.   Skin:     Coloration: Skin is not jaundiced.   Neurological:      Mental Status: She is alert. "   Psychiatric:         Mood and Affect: Mood normal.         Behavior: Behavior normal.         Thought Content: Thought content normal.                         DEXA BONE DENSITY AXIAL     Date of Exam:  3/14/2024 3:21 PM EDT     Indication:  screening     Comparison: 3/31/2016     Technique:  Lumbar vertebral and proximal femoral Dual-Energy X-ray Absorptiometry (DEXA) was performed on the Dayima C.     Findings:  According to the World Health Organization criteria, this is classified as osteoporosis.     The T-score at the femoral neck is -2.7.  The T-score for the total spine is -2.2.     The bone mineral density has decreased by 17.9% at the femoral neck and by 13.4% at the spine when compared to the prior study from 3/31/2016.              Using the FRAX scores, which utilized risk factors and femoral neck bone density:  The 10 year probability of major osteoporotic fracture is 8.5%.  The 10 year probability of a hip fracture is 2.8%.     IMPRESSION:  Impression:  Osteoporosis.  Osteoporosis.       ASSESSMENT & PLAN     Diagnoses and all orders for this visit:    1. Localized osteoporosis without current pathological fracture (Primary)  - new diagnosis since last visit. No bone fracture history. For exercise she does bending exercises and stretching. No walking. She drinks 1/2 glass sparkling wine nightly. She takes calcium and vitamin D every day.   -DEXA scan as above  -discussed increased fracture risk with osteoporosis and the morbidity associated with hip fractures  -will check Vitamin D and Ca  -encouraged her to increase physical activity to 30 min 5 days weekly at least with weight bearing exercise and to maintain minimal alcohol intake.   -Discussed with patient the pathophysiology of osteoporosis as well as the mechanism of action of bisphosphonate therapy.  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 She is agreeable to treatment. Will start alendronate 70 mg once weekly, plan to repeat DEXA in around 2 years.  -     Vitamin D,25-Hydroxy  -     Comprehensive Metabolic Panel  -     alendronate (Fosamax) 70 MG tablet; Take 1 tablet by mouth Every 7 (Seven) Days. Take in the morning with a full glass of water 30 minutes before any other medications or food. Stay upright for 30 minutes after each dose.  Dispense: 12 tablet; Refill: 1            Follow Up  No follow-ups on file.    Patient/family had no further questions at this time and verbalized understanding of the plan discussed today.

## 2024-03-22 LAB
25(OH)D3+25(OH)D2 SERPL-MCNC: 19.1 NG/ML (ref 30–100)
ALBUMIN SERPL-MCNC: 4.5 G/DL (ref 3.5–5.2)
ALBUMIN/GLOB SERPL: 2.3 G/DL
ALP SERPL-CCNC: 98 U/L (ref 39–117)
ALT SERPL-CCNC: 29 U/L (ref 1–33)
AST SERPL-CCNC: 22 U/L (ref 1–32)
BILIRUB SERPL-MCNC: 0.6 MG/DL (ref 0–1.2)
BUN SERPL-MCNC: 22 MG/DL (ref 8–23)
BUN/CREAT SERPL: 31.4 (ref 7–25)
CALCIUM SERPL-MCNC: 8.9 MG/DL (ref 8.6–10.5)
CHLORIDE SERPL-SCNC: 107 MMOL/L (ref 98–107)
CO2 SERPL-SCNC: 27.1 MMOL/L (ref 22–29)
CREAT SERPL-MCNC: 0.7 MG/DL (ref 0.57–1)
EGFRCR SERPLBLD CKD-EPI 2021: 91.5 ML/MIN/1.73
GLOBULIN SER CALC-MCNC: 2 GM/DL
GLUCOSE SERPL-MCNC: 104 MG/DL (ref 65–99)
POTASSIUM SERPL-SCNC: 4.3 MMOL/L (ref 3.5–5.2)
PROT SERPL-MCNC: 6.5 G/DL (ref 6–8.5)
SODIUM SERPL-SCNC: 142 MMOL/L (ref 136–145)

## 2024-03-23 RX ORDER — ERGOCALCIFEROL 1.25 MG/1
50000 CAPSULE ORAL WEEKLY
Qty: 8 CAPSULE | Refills: 0 | Status: SHIPPED | OUTPATIENT
Start: 2024-03-23 | End: 2024-05-12

## 2024-03-28 DIAGNOSIS — E78.00 PURE HYPERCHOLESTEROLEMIA: ICD-10-CM

## 2024-03-28 RX ORDER — ATORVASTATIN CALCIUM 40 MG/1
40 TABLET, FILM COATED ORAL DAILY
Qty: 90 TABLET | Refills: 1 | Status: SHIPPED | OUTPATIENT
Start: 2024-03-28

## 2024-04-16 ENCOUNTER — OFFICE VISIT (OUTPATIENT)
Dept: INTERNAL MEDICINE | Facility: CLINIC | Age: 74
End: 2024-04-16
Payer: MEDICARE

## 2024-04-16 VITALS
SYSTOLIC BLOOD PRESSURE: 110 MMHG | HEIGHT: 60 IN | BODY MASS INDEX: 18.26 KG/M2 | OXYGEN SATURATION: 98 % | DIASTOLIC BLOOD PRESSURE: 60 MMHG | WEIGHT: 93 LBS | HEART RATE: 55 BPM

## 2024-04-16 DIAGNOSIS — S80.212A ABRASION OF LEFT KNEE, INITIAL ENCOUNTER: ICD-10-CM
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
## 2024-04-16 PROCEDURE — 99213 OFFICE O/P EST LOW 20 MIN: CPT | Performed by: STUDENT IN AN ORGANIZED HEALTH CARE EDUCATION/TRAINING PROGRAM

## 2024-04-16 NOTE — PROGRESS NOTES
"  Brandin Rodriguez D.O.  Internal Medicine  [facility]  [address], [address]  [place] [address]  [phone]      Chief Complaint  Fall (On knees)    SUBJECTIVE    History of Present Illness    Nereyda Estrella is a 73 y.o. female who presents to the office today as an established patient that last saw me on 3/21/2024.   Here today for acute care visit.   Here for a fall that occurred 1.5 weeks ago.     States a grocery bag went through her feet and got tangled, she got shocked and fell down onto her hands and left knees. States her right elbow was red but that is gone now. States the left knee got bruised \"and that's it\". \"I'm here as a precaution\". \"It was really freaky!\". She did not hit her head  States since the fall she has been feeling weak. States she is applying an OTC ointment for scars to the left knee. No joints are hurting currently. Denies having any joints swelling.     Allergies   Allergen Reactions    Aspirin Swelling        Outpatient Medications Marked as Taking for the 4/16/24 encounter (Office Visit) with Brandin Rodriguez, DO   Medication Sig Dispense Refill    alendronate (Fosamax) 70 MG tablet Take 1 tablet by mouth Every 7 (Seven) Days. Take in the morning with a full glass of water 30 minutes before any other medications or food. Stay upright for 30 minutes after each dose. 12 tablet 1    amLODIPine (NORVASC) 2.5 MG tablet Take 1 tablet by mouth Daily. 90 tablet 1    atorvastatin (LIPITOR) 40 MG tablet TAKE 1 TABLET BY MOUTH DAILY 90 tablet 1    ergocalciferol (ERGOCALCIFEROL) 1.25 MG (65567 UT) capsule Take 1 capsule by mouth 1 (One) Time Per Week for 8 doses. 8 capsule 0    levothyroxine (SYNTHROID, LEVOTHROID) 50 MCG tablet Take 1 tablet by mouth Daily. Take at least 30 minutes before having breakfast with a sip of water. For low thyroid. 90 tablet 3    PARoxetine (PAXIL) 10 MG tablet Take 1 tablet by mouth Daily. 90 tablet 1        Past Medical History:   Diagnosis " "Date    Abnormal ankle brachial index (MANJTI)     Anxiety and depression     Carotid artery stenosis     Hyperlipidemia     Hypertension 2022    Hypothyroidism     Osteoporosis        OBJECTIVE    Vital Signs:   /60   Pulse 55   Ht 152.4 cm (60\")   Wt 42.2 kg (93 lb)   SpO2 98%   BMI 18.16 kg/m²        Physical Exam  Vitals reviewed.   Constitutional:       General: She is not in acute distress.     Appearance: Normal appearance. She is not ill-appearing.   Pulmonary:      Effort: Pulmonary effort is normal. No respiratory distress.   Musculoskeletal:      Comments: Right elbow: no swelling, erythema, visible trauma or injury. Normal ROM. No tenderness to palpation.  Right knee: no swelling, erythema, visible trauma or injury. Normal ROM. No tenderness to palpation.  Left knee: over the patella there are two dime-sized abrasions without surrounding erythema or discharge or drainage that appear to be healing. No swelling of the knee. Normal ROM.  No tenderness to palpation.   Neurological:      Mental Status: She is alert.   Psychiatric:         Mood and Affect: Mood normal.         Behavior: Behavior normal.         Thought Content: Thought content normal.                             ASSESSMENT & PLAN     Diagnoses and all orders for this visit:    1. Fall, initial encounter (Primary)  2. Abrasion of left knee, initial encounter  -States a grocery bag went through her feet and got tangled, she got shocked and fell down onto her hands and left knees. States her right elbow was red but that is gone now. States the left knee got bruised \"and that's it\". \"I'm here as a precaution\". \"It was really freaky!\". She did not hit her head  States since the fall she has been feeling weak. States she is applying an OTC ointment for scars to the left knee. No joints are hurting currently. Denies having any joints swelling.   -exam as above, overall consistent with simple knee abrasion  -she has no indication of joint " swelling/fracture so we will not get imaging today  -she can continue using OTC cream for scar healing  -she is overall just shaken up over the fall and advised her that it can take several weeks to get her footing back and comfortable with her walking. I did offer PT referral but she declined.           Follow Up  No follow-ups on file.    Patient/family had no further questions at this time and verbalized understanding of the plan discussed today.

## 2024-04-23 ENCOUNTER — TELEPHONE (OUTPATIENT)
Dept: INTERNAL MEDICINE | Facility: CLINIC | Age: 74
End: 2024-04-23
Payer: MEDICARE

## 2024-04-23 NOTE — TELEPHONE ENCOUNTER
Caller: Nereyda Estrella    Relationship: Self    Best call back number: [phone]     What was the call regarding: ASKING FOR AN ALTERNATIVE MEDICATION SUGGESTION THAN METAMUCIL.    IT IS NO LONGER WORKING.

## 2024-05-03 DIAGNOSIS — E03.9 HYPOTHYROIDISM, UNSPECIFIED TYPE: ICD-10-CM

## 2024-05-03 RX ORDER — LEVOTHYROXINE SODIUM 0.05 MG/1
TABLET ORAL
Qty: 90 TABLET | Refills: 3 | Status: SHIPPED | OUTPATIENT
Start: 2024-05-03

## 2024-05-14 DIAGNOSIS — I10 ESSENTIAL HYPERTENSION: ICD-10-CM

## 2024-05-14 RX ORDER — AMLODIPINE BESYLATE 2.5 MG/1
2.5 TABLET ORAL DAILY
Qty: 90 TABLET | Refills: 1 | Status: SHIPPED | OUTPATIENT
Start: 2024-05-14

## 2024-05-14 NOTE — TELEPHONE ENCOUNTER
Caller: Nereyda Estrella    Relationship: Self    Best call back number: [phone]     Requested Prescriptions:   Requested Prescriptions     Pending Prescriptions Disp Refills    amLODIPine (NORVASC) 2.5 MG tablet 90 tablet 1     Sig: Take 1 tablet by mouth Daily.        Pharmacy where request should be sent: [facility] [id number] [facility] [address] AT [address] [facility] [phone] [facility] [phone] FX     Last office visit with prescribing clinician: 4/16/2024   Last telemedicine visit with prescribing clinician: Visit date not found   Next office visit with prescribing clinician: 7/11/2024     Additional details provided by patient: PATIENT IS OUT OF MEDICATION    Does the patient have less than a 3 day supply:  [x] Yes  [] No    Would you like a call back once the refill request has been completed: [] Yes [] No    If the office needs to give you a call back, can they leave a voicemail: [] Yes [] No    Dany Isaac Rep   05/14/24 09:10 EDT

## 2024-05-17 RX ORDER — ERGOCALCIFEROL 1.25 MG/1
CAPSULE ORAL
Qty: 8 CAPSULE | Refills: 0 | Status: SHIPPED | OUTPATIENT
Start: 2024-05-17

## 2024-06-06 ENCOUNTER — OFFICE VISIT (OUTPATIENT)
Dept: INTERNAL MEDICINE | Facility: CLINIC | Age: 74
End: 2024-06-06
Payer: MEDICARE

## 2024-06-06 VITALS
HEIGHT: 60 IN | BODY MASS INDEX: 18.06 KG/M2 | OXYGEN SATURATION: 98 % | SYSTOLIC BLOOD PRESSURE: 100 MMHG | HEART RATE: 63 BPM | WEIGHT: 92 LBS | DIASTOLIC BLOOD PRESSURE: 60 MMHG

## 2024-06-06 DIAGNOSIS — H61.23 BILATERAL IMPACTED CERUMEN: Primary | ICD-10-CM

## 2024-06-06 PROCEDURE — 99212 OFFICE O/P EST SF 10 MIN: CPT | Performed by: STUDENT IN AN ORGANIZED HEALTH CARE EDUCATION/TRAINING PROGRAM

## 2024-06-06 NOTE — PROGRESS NOTES
"  Brandin Rodriguez D.O.  Internal Medicine  [facility]  [address], [address]  [place] [address]  [phone]      Chief Complaint  Earache (Right x 3 weeks)    SUBJECTIVE    History of Present Illness    Nereyda Estrella is a 73 y.o. female who presents to the office today as an established patient that last saw me on 4/16/2024.   Here today for acute care visit.    Pt reports ear ache on the right for 3 weeks. States she thought it was blockage and she took ear drops over the counter but it still feels \"heavy\" in the ear canal. No fever or chills. No drainage or discharge from the ear.She states the pain is not \"very painful, bothersome, gnawing\".     Allergies   Allergen Reactions    Aspirin Swelling        Outpatient Medications Marked as Taking for the 6/6/24 encounter (Office Visit) with Brandin Rodriguez, DO   Medication Sig Dispense Refill    alendronate (Fosamax) 70 MG tablet Take 1 tablet by mouth Every 7 (Seven) Days. Take in the morning with a full glass of water 30 minutes before any other medications or food. Stay upright for 30 minutes after each dose. 12 tablet 1    amLODIPine (NORVASC) 2.5 MG tablet Take 1 tablet by mouth Daily. 90 tablet 1    atorvastatin (LIPITOR) 40 MG tablet TAKE 1 TABLET BY MOUTH DAILY 90 tablet 1    levothyroxine (SYNTHROID, LEVOTHROID) 50 MCG tablet TAKE ONE TABLET BY MOUTH DAILY AT LEAST 30 MINUTES BEFOER BREAKFAST WITH A SIP OF WATER FOR LOW THYROID 90 tablet 3    PARoxetine (PAXIL) 10 MG tablet Take 1 tablet by mouth Daily. 90 tablet 1    vitamin D (ERGOCALCIFEROL) 1.25 MG (69788 UT) capsule capsule TAKE 1 CAPSULE BY MOUTH ONCE A WEEK FOR 8 DOSES 8 capsule 0        Past Medical History:   Diagnosis Date    Abnormal ankle brachial index (MANJIT)     Anxiety and depression     Carotid artery stenosis     Hyperlipidemia     Hypertension 2022    Hypothyroidism     Osteoporosis        OBJECTIVE    Vital Signs:   /60   Pulse 63   Ht 152.4 cm (60\")   Wt " 41.7 kg (92 lb)   SpO2 98%   BMI 17.97 kg/m²        Physical Exam  Vitals reviewed.   Constitutional:       General: She is not in acute distress.     Appearance: Normal appearance. She is not ill-appearing.   HENT:      Right Ear: External ear normal. There is impacted cerumen.      Left Ear: External ear normal. There is impacted cerumen.   Pulmonary:      Effort: Pulmonary effort is normal. No respiratory distress.   Skin:     Coloration: Skin is not jaundiced.   Neurological:      Mental Status: She is alert.   Psychiatric:         Mood and Affect: Mood normal.         Behavior: Behavior normal.         Thought Content: Thought content normal.                             ASSESSMENT & PLAN     Diagnoses and all orders for this visit:    1. Bilateral impacted cerumen (Primary)  -pt presents to office today for right ear pain  -on exam there is cerumen impaction in both ears, unable to evaluate the remainder of the ear canal or TM.  I offered cerumen irrigation in office today but she declines. She would prefer to have manual removal with an ENT. Referral placed today.  -     Ambulatory Referral to ENT (Otolaryngology)            Follow Up  No follow-ups on file.    Patient/family had no further questions at this time and verbalized understanding of the plan discussed today.

## 2024-06-18 ENCOUNTER — APPOINTMENT (OUTPATIENT)
Dept: WOMENS IMAGING | Facility: HOSPITAL | Age: 74
End: 2024-06-18
Payer: MEDICARE

## 2024-06-18 PROCEDURE — 76642 ULTRASOUND BREAST LIMITED: CPT | Performed by: RADIOLOGY

## 2024-06-18 PROCEDURE — G0279 TOMOSYNTHESIS, MAMMO: HCPCS | Performed by: RADIOLOGY

## 2024-06-18 PROCEDURE — 77065 DX MAMMO INCL CAD UNI: CPT | Performed by: RADIOLOGY

## 2024-07-09 RX ORDER — ERGOCALCIFEROL 1.25 MG/1
50000 CAPSULE ORAL WEEKLY
Qty: 8 CAPSULE | Refills: 0 | Status: SHIPPED | OUTPATIENT
Start: 2024-07-09 | End: 2024-07-11

## 2024-07-11 ENCOUNTER — OFFICE VISIT (OUTPATIENT)
Dept: INTERNAL MEDICINE | Facility: CLINIC | Age: 74
End: 2024-07-11
Payer: MEDICARE

## 2024-07-11 VITALS
HEART RATE: 70 BPM | BODY MASS INDEX: 18.06 KG/M2 | WEIGHT: 92 LBS | OXYGEN SATURATION: 97 % | HEIGHT: 60 IN | DIASTOLIC BLOOD PRESSURE: 60 MMHG | SYSTOLIC BLOOD PRESSURE: 108 MMHG

## 2024-07-11 DIAGNOSIS — E03.9 HYPOTHYROIDISM, UNSPECIFIED TYPE: ICD-10-CM

## 2024-07-11 DIAGNOSIS — Z88.6 HISTORY OF ALLERGY TO ASPIRIN: ICD-10-CM

## 2024-07-11 DIAGNOSIS — Z00.00 ANNUAL PHYSICAL EXAM: Primary | ICD-10-CM

## 2024-07-11 DIAGNOSIS — E55.9 VITAMIN D DEFICIENCY: ICD-10-CM

## 2024-07-11 DIAGNOSIS — M81.6 LOCALIZED OSTEOPOROSIS WITHOUT CURRENT PATHOLOGICAL FRACTURE: ICD-10-CM

## 2024-07-11 DIAGNOSIS — I65.23 BILATERAL CAROTID ARTERY STENOSIS: ICD-10-CM

## 2024-07-11 DIAGNOSIS — Z23 NEED FOR PNEUMOCOCCAL VACCINE: ICD-10-CM

## 2024-07-11 PROCEDURE — 90677 PCV20 VACCINE IM: CPT | Performed by: STUDENT IN AN ORGANIZED HEALTH CARE EDUCATION/TRAINING PROGRAM

## 2024-07-11 PROCEDURE — G0009 ADMIN PNEUMOCOCCAL VACCINE: HCPCS | Performed by: STUDENT IN AN ORGANIZED HEALTH CARE EDUCATION/TRAINING PROGRAM

## 2024-07-11 PROCEDURE — 99397 PER PM REEVAL EST PAT 65+ YR: CPT | Performed by: STUDENT IN AN ORGANIZED HEALTH CARE EDUCATION/TRAINING PROGRAM

## 2024-07-11 PROCEDURE — 99214 OFFICE O/P EST MOD 30 MIN: CPT | Performed by: STUDENT IN AN ORGANIZED HEALTH CARE EDUCATION/TRAINING PROGRAM

## 2024-07-11 RX ORDER — ALENDRONATE SODIUM 70 MG/1
70 TABLET ORAL
Qty: 12 TABLET | Refills: 1 | Status: SHIPPED | OUTPATIENT
Start: 2024-07-11

## 2024-07-11 NOTE — PROGRESS NOTES
"  Brandin Rodriguez D.O.  Internal Medicine  [facility]  [address], [address]  [place] [address]  [phone]    Chief Complaint  Annual checkup    HISTORY    Nereyda Estrella is a 73 y.o. female who presents to the office today as a  an established patient for their annual preventative exam.     No hospitalization(s) within the last year.     Status of chronic medical conditions:    Anxiety and depression: takes paroxetine 10 mg daily. States this is \"fine really\". She practices mindfulness and reading.      HLD: takes atorvastatin 40 mg daily for primary prevention . She is not taking aspirin 81 mg daily, states she had an aspirin allergy as a child.      Hypothyroidism: takes levothyroxine 50 mcg daily; denies having any thyroid nodules/cancer    Pt states there are some days she wakes up she feels this enormous lethargy that dissipates over 10 minutes. She does not feel tired any of the remainder of the day.   Lab Results   Component Value Date    TSH 0.887 05/10/2023     HTN: taking amlodipine 2.5 mg daily. Doesn't check blood pressure at home.     Osteoporosis:  No bone fracture history. For exercise she is walking every day. She is also doing yoga and stretching. She is on alendronate 70 mg weekly, start in 2023. She takes calcium and vitamin D every day.     Patient's overall comments about their health or any other specific health concerns they report: none      Health Maintenance Summary            Overdue - TDAP/TD VACCINES (1 - Tdap) Never done      No completion history exists for this topic.              Overdue - ZOSTER VACCINE (1 of 2) Never done      No completion history exists for this topic.              Ordered - Pneumococcal Vaccine 65+ (1 of 1 - PCV) Ordered on 7/11/2024      No completion history exists for this topic.              Ordered - LIPID PANEL (Yearly) Ordered on 7/11/2024 11/03/2022  Lipid Panel With LDL/HDL Ratio    07/14/2022  Lipid Panel With " LDL/HDL Ratio              Overdue - COVID-19 Vaccine (6 - 2023-24 season) Overdue since 5/11/2024 01/11/2024  Imm Admin: COVID-19 F23 (PFIZER) 12YRS+ (COMIRNATY)    09/23/2022  Imm Admin: COVID-19 (PFIZER) BIVALENT 12+YRS    11/22/2021  Imm Admin: COVID-19 (PFIZER) Purple Cap Monovalent    03/31/2021  Imm Admin: COVID-19 (PFIZER) Purple Cap Monovalent    03/10/2021  Imm Admin: COVID-19 (PFIZER) Purple Cap Monovalent    Only the first 5 history entries have been loaded, but more history exists.              Ordered - MAMMOGRAM (Every 2 Years) Ordered on 4/1/2024 08/26/2022  SCANNED - MAMMO              INFLUENZA VACCINE (Yearly - August to March) Next due on 8/1/2024      No completion history exists for this topic.              ANNUAL WELLNESS VISIT (Yearly) Next due on 1/11/2025 01/11/2024  Done    01/11/2024  Level of Service: [place] PPPS, SUBSEQ VISIT              BMI FOLLOWUP (Yearly) Next due on 1/11/2025 01/11/2024  [employer]: WORKFLOW - QUALITY MEASUREMENT - DOCUMENTED WEIGHT FOLLOW-UP PLAN              DXA SCAN (Every 2 Years) Next due on 3/14/2026      03/14/2024  DEXA Bone Density Axial              COLORECTAL CANCER SCREENING (COLOGUARD - Every 3 Years) Next due on 1/21/2027 01/21/2024  Cologuard component of Cologuard - Stool, Per Rectum    10/08/2020  SCANNED - COLOGUARD    10/01/2020  Cologuard - ,              HEPATITIS C SCREENING  Completed      07/14/2022  Hep C Virus Ab component of Hepatitis C antibody                     Allergies   Allergen Reactions    Aspirin Swelling        Outpatient Medications Marked as Taking for the 7/11/24 encounter (Office Visit) with Brandin Rodriguez,    Medication Sig Dispense Refill    alendronate (Fosamax) 70 MG tablet Take 1 tablet by mouth Every 7 (Seven) Days. Take in the morning with a full glass of water 30 minutes before any other medications or food. Stay upright for 30 minutes after each dose. 12 tablet 1    amLODIPine (NORVASC) 2.5 MG  "tablet Take 1 tablet by mouth Daily. 90 tablet 1    atorvastatin (LIPITOR) 40 MG tablet TAKE 1 TABLET BY MOUTH DAILY 90 tablet 1    levothyroxine (SYNTHROID, LEVOTHROID) 50 MCG tablet TAKE ONE TABLET BY MOUTH DAILY AT LEAST 30 MINUTES BEFOER BREAKFAST WITH A SIP OF WATER FOR LOW THYROID 90 tablet 3    PARoxetine (PAXIL) 10 MG tablet Take 1 tablet by mouth Daily. 90 tablet 1    [DISCONTINUED] alendronate (Fosamax) 70 MG tablet Take 1 tablet by mouth Every 7 (Seven) Days. Take in the morning with a full glass of water 30 minutes before any other medications or food. Stay upright for 30 minutes after each dose. 12 tablet 1        Past Medical History:   Diagnosis Date    Abnormal ankle brachial index (MANJIT)     Anxiety and depression     Carotid artery stenosis     Hyperlipidemia     Hypertension 2022    Hypothyroidism     Osteoporosis      Past Surgical History:   Procedure Laterality Date    NO PAST SURGERIES       Family History   Problem Relation Age of Onset    Hepatitis Mother         from blood transfusion    Prostate cancer Father     Breast cancer Sister     No Known Problems Sister     No Known Problems Sister     No Known Problems Sister     Diabetes Brother     Other Brother         polio    No Known Problems Brother     No Known Problems Brother     reports that she quit smoking about 55 years ago. Her smoking use included cigarettes. She started smoking about 56 years ago. She has a 0.1 pack-year smoking history. She has never used smokeless tobacco. She reports current alcohol use of about 4.0 - 5.0 standard drinks of alcohol per week. She reports that she does not use drugs.    Immunization History   Administered Date(s) Administered    COVID-19 (PFIZER) BIVALENT 12+YRS 09/23/2022    COVID-19 (PFIZER) Purple Cap Monovalent 03/10/2021, 03/31/2021, 11/22/2021    COVID-19 F23 (PFIZER) 12YRS+ (COMIRNATY) 01/11/2024        OBJECTIVE    Vital Signs:   /60   Pulse 70   Ht 152.4 cm (60\")   Wt 41.7 kg " (92 lb)   SpO2 97%   BMI 17.97 kg/m²     Physical Exam  Vitals reviewed.   Constitutional:       General: She is not in acute distress.     Appearance: Normal appearance. She is not ill-appearing.   HENT:      Head: Normocephalic and atraumatic.      Right Ear: Tympanic membrane, ear canal and external ear normal. There is no impacted cerumen.      Left Ear: Tympanic membrane, ear canal and external ear normal. There is no impacted cerumen.      Mouth/Throat:      Mouth: Mucous membranes are moist.      Pharynx: No oropharyngeal exudate or posterior oropharyngeal erythema.   Eyes:      General: No scleral icterus.     Extraocular Movements: Extraocular movements intact.      Conjunctiva/sclera: Conjunctivae normal.      Pupils: Pupils are equal, round, and reactive to light.   Cardiovascular:      Rate and Rhythm: Normal rate and regular rhythm.      Heart sounds: Normal heart sounds. No murmur heard.  Pulmonary:      Effort: Pulmonary effort is normal. No respiratory distress.      Breath sounds: Normal breath sounds. No wheezing.   Abdominal:      General: Bowel sounds are normal. There is no distension.      Palpations: Abdomen is soft.      Tenderness: There is no abdominal tenderness. There is no guarding.   Musculoskeletal:      Cervical back: Neck supple.      Right lower leg: No edema.      Left lower leg: No edema.   Lymphadenopathy:      Cervical: No cervical adenopathy.   Skin:     General: Skin is warm and dry.      Coloration: Skin is not jaundiced.   Neurological:      General: No focal deficit present.      Mental Status: She is alert and oriented to person, place, and time.      Cranial Nerves: No cranial nerve deficit.      Motor: No weakness.   Psychiatric:         Mood and Affect: Mood normal.         Behavior: Behavior normal.         Thought Content: Thought content normal.                         ASSESSMENT & PLAN     #Annual Preventative Health Examination   -Age and sex appropriate physical  exam performed and documented. Updated past medical, family, social and surgical histories as well as allergies and care team list. Addressed care gaps listed in the medical record.  -Encouraged annual dental and vision exams as part of their overall health.  -Encouraged minimum of 30 minutes or more of exercise at a brisk walk or higher 5 days per week combined with a well-balanced diet.  -Immunizations reviewed and updated in EMR. Td, Prevnar 20 (Pneumococcal 20-valent conjugate), Shingrix, and COVID19 recommended.  -Lipid screening:  Patient is already on statin therapy..   -Depression and Anxiety screening: Patient has known diagnosis of depression and / or anxiety.  -Diabetes screening:  Screening not indicated at this time.   -Tobacco use screening: Conducted and addressed if indicated.   -Alcohol use screening: Conducted and addressed if indicated.   -Illicit drug screening: Conducted and addressed if indicated.   -Hypertension screening: Patient with known diagnosis of hypertension and is receiving treatment.  -HIV screening: Patient is over age 65, screening not indicated.   -Syphilis screening: Syphilis screening not indicated.  -Hepatitis B virus screening: Screening not indicated, not in a high-risk group.  -Hepatitis C virus screening:  Patient has already completed Hepatitis C screening. Negative screening on file.   -Colon cancer screening: cologuard negative 1/2024  -Lung cancer screening: Patient has smoked but does not meet other eligibility criteria for screening.  -Cervical cancer screening: aged out  -Breast cancer screening:  will get mammogram reports   -BRCA related cancer screening:  Patient does not have a known personal or family history.   -Osteoporosis screening: known osteoporosis     Follow up in 1 year for annual physical exam.    Patient/family had no further questions at this time and verbalized understanding of the plan discussed today.     A problem-based visit was also conducted  on the same day, see below for assessment and plan    Diagnoses and all orders for this visit:    1. Bilateral carotid artery stenosis (Primary)  2. History of allergy to aspirin  - takes atorvastatin 40 mg daily for primary prevention . She is not taking aspirin 81 mg daily, states she had an aspirin allergy as a child.   -I will refer patient to allergy for help with determining if the patient is safe for aspirin therapy.  She is agreeable.  Lab Results   Component Value Date    CHLPL 142 11/03/2022    TRIG 84 11/03/2022    HDL 60 11/03/2022    LDL 66 11/03/2022     -Last lipid profile as above with great control.  Recheck fasting lipid profile today.  Goal LDL less than 70.  Adjust regimen as needed.  -     Ambulatory Referral to Allergy    3. Hypothyroidism, unspecified type  -takes levothyroxine 50 mcg daily; denies having any thyroid nodules/cancer    Pt states there are some days she wakes up she feels this enormous lethargy that dissipates over 10 minutes. She does not feel tired any of the remainder of the day.   -Recheck TSH today to ensure at goal.  Adjust regimen if needed.  Will also check CBC.  -     TSH Rfx On Abnormal To Free T4  -     CBC & Differential    4. Localized osteoporosis without current pathological fracture  5. Vitamin D deficiency  -No bone fracture history. For exercise she is walking every day. She is also doing yoga and stretching. She is on alendronate 70 mg weekly, start in 2023. She takes calcium and vitamin D every day.   -She was vitamin D deficient March 2024 with vitamin D of 19.1.  She completed a course of weekly high-dose vitamin D.  She is now on a vitamin D calcium supplement over-the-counter.  Recheck vitamin D level today.  Continue alendronate.  -     Vitamin D 25 hydroxy            The following social determinates of health impact the patient's medical decision making: No social determinates of health were factored in to today's visit.     Follow Up  Return in about  6 months (around 1/11/2025) for Medicare Wellness.

## 2024-07-12 LAB
25(OH)D3+25(OH)D2 SERPL-MCNC: 39.6 NG/ML (ref 30–100)
BASOPHILS # BLD AUTO: 0 X10E3/UL (ref 0–0.2)
BASOPHILS NFR BLD AUTO: 1 %
CHOLEST SERPL-MCNC: 157 MG/DL (ref 100–199)
EOSINOPHIL # BLD AUTO: 0.2 X10E3/UL (ref 0–0.4)
EOSINOPHIL NFR BLD AUTO: 4 %
ERYTHROCYTE [DISTWIDTH] IN BLOOD BY AUTOMATED COUNT: 12.8 % (ref 11.7–15.4)
HCT VFR BLD AUTO: 41.3 % (ref 34–46.6)
HDLC SERPL-MCNC: 59 MG/DL
HGB BLD-MCNC: 13.9 G/DL (ref 11.1–15.9)
IMM GRANULOCYTES # BLD AUTO: 0 X10E3/UL (ref 0–0.1)
IMM GRANULOCYTES NFR BLD AUTO: 0 %
LDLC SERPL CALC-MCNC: 76 MG/DL (ref 0–99)
LYMPHOCYTES # BLD AUTO: 1.6 X10E3/UL (ref 0.7–3.1)
LYMPHOCYTES NFR BLD AUTO: 28 %
MCH RBC QN AUTO: 31.3 PG (ref 26.6–33)
MCHC RBC AUTO-ENTMCNC: 33.7 G/DL (ref 31.5–35.7)
MCV RBC AUTO: 93 FL (ref 79–97)
MONOCYTES # BLD AUTO: 0.5 X10E3/UL (ref 0.1–0.9)
MONOCYTES NFR BLD AUTO: 8 %
NEUTROPHILS # BLD AUTO: 3.5 X10E3/UL (ref 1.4–7)
NEUTROPHILS NFR BLD AUTO: 59 %
PLATELET # BLD AUTO: 262 X10E3/UL (ref 150–450)
RBC # BLD AUTO: 4.44 X10E6/UL (ref 3.77–5.28)
TRIGL SERPL-MCNC: 123 MG/DL (ref 0–149)
TSH SERPL DL<=0.005 MIU/L-ACNC: 1.65 UIU/ML (ref 0.45–4.5)
VLDLC SERPL CALC-MCNC: 22 MG/DL (ref 5–40)
WBC # BLD AUTO: 5.8 X10E3/UL (ref 3.4–10.8)

## 2024-07-24 DIAGNOSIS — M81.6 LOCALIZED OSTEOPOROSIS WITHOUT CURRENT PATHOLOGICAL FRACTURE: ICD-10-CM

## 2024-07-24 RX ORDER — ALENDRONATE SODIUM 70 MG/1
70 TABLET ORAL WEEKLY
Qty: 12 TABLET | Refills: 1 | Status: SHIPPED | OUTPATIENT
Start: 2024-07-24

## 2024-09-25 DIAGNOSIS — E78.00 PURE HYPERCHOLESTEROLEMIA: ICD-10-CM

## 2024-09-25 RX ORDER — ATORVASTATIN CALCIUM 40 MG/1
40 TABLET, FILM COATED ORAL DAILY
Qty: 90 TABLET | Refills: 1 | Status: SHIPPED | OUTPATIENT
Start: 2024-09-25

## 2024-10-14 DIAGNOSIS — M81.6 LOCALIZED OSTEOPOROSIS WITHOUT CURRENT PATHOLOGICAL FRACTURE: ICD-10-CM

## 2024-10-14 RX ORDER — ALENDRONATE SODIUM 70 MG/1
70 TABLET ORAL WEEKLY
Qty: 12 TABLET | Refills: 1 | Status: SHIPPED | OUTPATIENT
Start: 2024-10-14

## 2024-10-14 NOTE — TELEPHONE ENCOUNTER
Caller: Nereyda Estrella    Relationship: Self    Best call back number: [phone]     Requested Prescriptions:   Requested Prescriptions     Pending Prescriptions Disp Refills    alendronate (FOSAMAX) 70 MG tablet 12 tablet 1     Sig: Take 1 tablet by mouth 1 (One) Time Per Week. Take on an empty stomach before breakfast with 8 ounces of water. Remain upright for 30 minutes after taking.        Pharmacy where request should be sent: [facility] [id number] [address] AT [facility] [phone] [facility] [phone] FX     Last office visit with prescribing clinician: 7/11/2024   Last telemedicine visit with prescribing clinician: Visit date not found   Next office visit with prescribing clinician: 1/13/2025       Does the patient have less than a 3 day supply:  [x] Yes  [] No

## 2025-01-13 ENCOUNTER — OFFICE VISIT (OUTPATIENT)
Dept: INTERNAL MEDICINE | Facility: CLINIC | Age: 75
End: 2025-01-13
Payer: MEDICARE

## 2025-01-13 VITALS
HEART RATE: 64 BPM | HEIGHT: 60 IN | TEMPERATURE: 98.2 F | OXYGEN SATURATION: 97 % | WEIGHT: 93.6 LBS | BODY MASS INDEX: 18.38 KG/M2 | DIASTOLIC BLOOD PRESSURE: 70 MMHG | SYSTOLIC BLOOD PRESSURE: 116 MMHG

## 2025-01-13 DIAGNOSIS — M79.661 PAIN OF RIGHT LOWER LEG: ICD-10-CM

## 2025-01-13 DIAGNOSIS — Z00.00 MEDICARE ANNUAL WELLNESS VISIT, SUBSEQUENT: Primary | ICD-10-CM

## 2025-01-13 PROCEDURE — 99212 OFFICE O/P EST SF 10 MIN: CPT | Performed by: STUDENT IN AN ORGANIZED HEALTH CARE EDUCATION/TRAINING PROGRAM

## 2025-01-13 PROCEDURE — G0439 PPPS, SUBSEQ VISIT: HCPCS | Performed by: STUDENT IN AN ORGANIZED HEALTH CARE EDUCATION/TRAINING PROGRAM

## 2025-01-13 PROCEDURE — 1125F AMNT PAIN NOTED PAIN PRSNT: CPT | Performed by: STUDENT IN AN ORGANIZED HEALTH CARE EDUCATION/TRAINING PROGRAM

## 2025-01-13 PROCEDURE — 96160 PT-FOCUSED HLTH RISK ASSMT: CPT | Performed by: STUDENT IN AN ORGANIZED HEALTH CARE EDUCATION/TRAINING PROGRAM

## 2025-01-13 NOTE — PROGRESS NOTES
" Subjective   The ABCs of the Annual Wellness Visit  Medicare Wellness Visit      Nereyda Estrella is a 74 y.o. patient who presents for a Medicare Wellness Visit.    The following portions of the patient's history were reviewed and   updated as appropriate: allergies, current medications, past family history, past medical history, past social history, past surgical history, and problem list.    Anxiety and depression: takes paroxetine 10 mg daily. States this is \"just fine, manageable nothing serious or alarming\"      HLD: takes atorvastatin 40 mg daily for primary prevention .      Hypothyroidism: takes levothyroxine 50 mcg daily; denies having any thyroid nodules/cancer   Lab Results   Component Value Date    TSH 1.650 07/11/2024     HTN: taking amlodipine 2.5 mg daily. Doesn't check blood pressure at home.      Osteoporosis:  No bone fracture history. For exercise she is walking every day. She is also doing yoga and stretching. She is on alendronate 70 mg weekly, start in 2023. She takes calcium and vitamin D every day.     Patient states has pain on the outside of the right leg below the knee. Comes when standing up after being seated after a long period of time and when bending. . It comes and goes like a flashing light on and off. No injuries that she knows of, going on for a few weeks. This occurs 3 times per day, it lasts 5 seconds per episodes . She has intensified her stretching.       Compared to one year ago, the patient's physical   health is the same. \"I'm in good shape\"  Compared to one year ago, the patient's mental   health is the same.    Recent Hospitalizations:  She was not admitted to the hospital during the last year.     Current Medical Providers:  Patient Care Team:  Brandin Rodriguez DO as PCP - General (Internal Medicine)  Ralph Hayden Jr., MD as Consulting Physician (Family Medicine)    Outpatient Medications Prior to Visit   Medication Sig Dispense Refill    alendronate (FOSAMAX) 70 MG " "tablet Take 1 tablet by mouth 1 (One) Time Per Week. Take on an empty stomach before breakfast with 8 ounces of water. Remain upright for 30 minutes after taking. 12 tablet 1    amLODIPine (NORVASC) 2.5 MG tablet Take 1 tablet by mouth Daily. 90 tablet 1    atorvastatin (LIPITOR) 40 MG tablet TAKE 1 TABLET BY MOUTH DAILY 90 tablet 1    levothyroxine (SYNTHROID, LEVOTHROID) 50 MCG tablet TAKE ONE TABLET BY MOUTH DAILY AT LEAST 30 MINUTES BEFOER BREAKFAST WITH A SIP OF WATER FOR LOW THYROID 90 tablet 3    PARoxetine (PAXIL) 10 MG tablet Take 1 tablet by mouth Daily. 90 tablet 1     No facility-administered medications prior to visit.     No opioid medication identified on active medication list. I have reviewed chart for other potential  high risk medication/s and harmful drug interactions in the elderly.      Aspirin is not on active medication list.  Aspirin use is not indicated based on review of current medical condition/s. Risk of harm outweighs potential benefits.  .    Patient Active Problem List   Diagnosis    Furuncle    Hypothyroidism (acquired)     Advance Care Planning Advance Directive is not on file.  ACP discussion was held with the patient during this visit. Patient does not have an advance directive, information provided.            Objective   Vitals:    01/13/25 1558   BP: 116/70   Pulse: 64   Temp: 98.2 °F (36.8 °C)   TempSrc: Infrared   SpO2: 97%   Weight: 42.5 kg (93 lb 9.6 oz)   Height: 152.4 cm (60\")   PainSc:   2   Physical Exam  Vitals reviewed.   Constitutional:       General: She is not in acute distress.     Appearance: She is not ill-appearing.      Comments: underweight   Eyes:      General: No scleral icterus.  Pulmonary:      Effort: Pulmonary effort is normal. No respiratory distress.   Musculoskeletal:      Right lower leg: No edema.      Left lower leg: No edema.      Comments: Right lower extremity below the knee: no deformity, tenderness to palpation or pain with active or passive " "ROM of the knee or ankle. No reproducible pain when the patient walks in the office today.    Skin:     Coloration: Skin is not jaundiced.   Neurological:      Mental Status: She is alert.   Psychiatric:         Mood and Affect: Mood normal.         Behavior: Behavior normal.         Thought Content: Thought content normal.           Estimated body mass index is 18.28 kg/m² as calculated from the following:    Height as of this encounter: 152.4 cm (60\").    Weight as of this encounter: 42.5 kg (93 lb 9.6 oz).    BMI is below normal parameters (malnutrition). Recommendations: recommended one ensure high protein daily           Does the patient have evidence of cognitive impairment? No                                                                                               Health  Risk Assessment    Smoking Status:  Social History     Tobacco Use   Smoking Status Former    Current packs/day: 0.00    Average packs/day: 0.1 packs/day for 1 year (0.1 ttl pk-yrs)    Types: Cigarettes    Start date:     Quit date:     Years since quittin.0   Smokeless Tobacco Never   Tobacco Comments    minimal cigarette use in college     Alcohol Consumption:  Social History     Substance and Sexual Activity   Alcohol Use Yes    Alcohol/week: 7.0 standard drinks of alcohol    Types: 7 Glasses of wine per week       Fall Risk Screen  [facility], and patient is at MODERATE risk for falls. Assessment completed on:2025    Depression Screening   Little interest or pleasure in doing things? Not at all   Feeling down, depressed, or hopeless? Not at all   PHQ-2 Total Score 0      Health Habits and Functional and Cognitive Screenin/6/2025    10:05 PM   Functional & Cognitive Status   Do you have difficulty preparing food and eating? No    Do you have difficulty bathing yourself, getting dressed or grooming yourself? No    Do you have difficulty using the toilet? No    Do you have " difficulty moving around from place to place? No    Do you have trouble with steps or getting out of a bed or a chair? No    Current Diet Low Carb Diet    Dental Exam Up to date    Eye Exam Up to date    Exercise (times per week) 4 times per week    Current Exercises Include House Cleaning;Other;Walking    Do you need help using the phone?  No    Are you deaf or do you have serious difficulty hearing?  No    Do you need help to go to places out of walking distance? No    Do you need help shopping? No    Do you need help preparing meals?  No    Do you need help with housework?  No    Do you need help with laundry? No    Do you need help taking your medications? No    Do you need help managing money? No    Do you ever drive or ride in a car without wearing a seat belt? No    Have you felt unusual stress, anger or loneliness in the last month? No    Who do you live with? Spouse    If you need help, do you have trouble finding someone available to you? No    Have you been bothered in the last four weeks by sexual problems? No    Do you have difficulty concentrating, remembering or making decisions? No        Patient-reported           Age-appropriate Screening Schedule:  Refer to the list below for future screening recommendations based on patient's age, sex and/or medical conditions. Orders for these recommended tests are listed in the plan section. The patient has been provided with a written plan.    Health Maintenance List  Health Maintenance   Topic Date Due    TDAP/TD VACCINES (1 - Tdap) Never done    ZOSTER VACCINE (1 of 2) Never done    INFLUENZA VACCINE  Never done    LIPID PANEL  07/11/2025    ANNUAL WELLNESS VISIT  01/13/2026    BMI FOLLOWUP  01/13/2026    DXA SCAN  03/14/2026    MAMMOGRAM  06/18/2026    COLORECTAL CANCER SCREENING  01/21/2027    HEPATITIS C SCREENING  Completed    COVID-19 Vaccine  Completed    Pneumococcal Vaccine 65+  Completed                                                                                                                                                 CMS Preventative Services Quick Reference  Risk Factors Identified During Encounter  Fall Risk-High or Moderate: Discussed Fall Prevention in the home  Immunizations Discussed/Encouraged: Td, Influenza, Shingrix, and RSV (Respiratory Syncytial Virus)    The above risks/problems have been discussed with the patient.  Pertinent information has been shared with the patient in the After Visit Summary.  An After Visit Summary and PPPS were made available to the patient.    Follow Up:   Next Medicare Wellness visit to be scheduled in 1 year.     A problem-based visit was also conducted on the same day, see below for assessment and plan    Diagnoses and all orders for this visit:    1. Pain of right lower leg (Primary)  -Patient states has pain on the outside of the right leg below the knee. Comes when standing up after being seated after a long period of time and when bending. . It comes and goes like a flashing light on and off. No injuries that she knows of, going on for a few weeks. This occurs 3 times per day, it lasts 5 seconds per episodes . She has intensified her stretching.   -no abnormality on exam and pain is not reproducible   -at this point it is likely she has a sprain or strain and with her normal exam I would like her to monitor over the next 2-3 weeks for improvement vs persistence. If no improvement after that time we can consider imaging and referral to physical therapy. She will let me know at that time.           The following social determinates of health impact the patient's medical decision making: No social determinates of health were factored in to today's visit.     Follow Up  Return in about 6 months (around 7/13/2025) for Annual physical.

## 2025-01-28 DIAGNOSIS — F41.8 MIXED ANXIETY DEPRESSIVE DISORDER: ICD-10-CM

## 2025-01-28 DIAGNOSIS — I10 ESSENTIAL HYPERTENSION: ICD-10-CM

## 2025-01-28 RX ORDER — AMLODIPINE BESYLATE 2.5 MG/1
2.5 TABLET ORAL DAILY
Qty: 90 TABLET | Refills: 2 | Status: SHIPPED | OUTPATIENT
Start: 2025-01-28

## 2025-01-28 RX ORDER — PAROXETINE 10 MG/1
10 TABLET, FILM COATED ORAL DAILY
Qty: 90 TABLET | Refills: 2 | Status: SHIPPED | OUTPATIENT
Start: 2025-01-28

## 2025-02-18 NOTE — TELEPHONE ENCOUNTER
Caller: Radha Ballard    Relationship: Self         What was the call regarding: PATIENT CAN NOT GET CHAIR OUT TODAY         Pt aware

## 2025-02-28 ENCOUNTER — OFFICE VISIT (OUTPATIENT)
Dept: INTERNAL MEDICINE | Facility: CLINIC | Age: 75
End: 2025-02-28
Payer: MEDICARE

## 2025-02-28 VITALS
HEART RATE: 59 BPM | DIASTOLIC BLOOD PRESSURE: 56 MMHG | HEIGHT: 60 IN | WEIGHT: 93.2 LBS | OXYGEN SATURATION: 98 % | TEMPERATURE: 98.8 F | BODY MASS INDEX: 18.3 KG/M2 | SYSTOLIC BLOOD PRESSURE: 100 MMHG

## 2025-02-28 DIAGNOSIS — J06.9 VIRAL URI: Primary | ICD-10-CM

## 2025-02-28 LAB
EXPIRATION DATE: NORMAL
FLUAV AG UPPER RESP QL IA.RAPID: NOT DETECTED
FLUBV AG UPPER RESP QL IA.RAPID: NOT DETECTED
INTERNAL CONTROL: NORMAL
Lab: NORMAL
SARS-COV-2 AG UPPER RESP QL IA.RAPID: NOT DETECTED

## 2025-02-28 PROCEDURE — 1125F AMNT PAIN NOTED PAIN PRSNT: CPT | Performed by: STUDENT IN AN ORGANIZED HEALTH CARE EDUCATION/TRAINING PROGRAM

## 2025-02-28 PROCEDURE — 87428 SARSCOV & INF VIR A&B AG IA: CPT | Performed by: STUDENT IN AN ORGANIZED HEALTH CARE EDUCATION/TRAINING PROGRAM

## 2025-02-28 PROCEDURE — 99213 OFFICE O/P EST LOW 20 MIN: CPT | Performed by: STUDENT IN AN ORGANIZED HEALTH CARE EDUCATION/TRAINING PROGRAM

## 2025-02-28 RX ORDER — BENZONATATE 100 MG/1
100 CAPSULE ORAL 3 TIMES DAILY PRN
Qty: 30 CAPSULE | Refills: 0 | Status: SHIPPED | OUTPATIENT
Start: 2025-02-28

## 2025-02-28 NOTE — PROGRESS NOTES
rBandin Rodriguez DO, [facility]  Internal Medicine  [facility]  [address], [address]  [place] [address]  [phone]    Chief Complaint  Headache, Cough, and Fatigue (Off balance started yesterday 2/27/25)    SUBJECTIVE    History of Present Illness    Nereyda Estrella is a 74 y.o. female who presents to the office today as an established patient that last saw me on 1/13/2025.     Pt states 2 days ago she started feeling sick with itchy throat, cough, weakness , feeling off balance, headache. The cough is mostly dry. She has tried mucinex without any improvement In symptoms. She feels the same if not worse since onset. No shortness of air or fever. Her  was sick around 4 days ago and is now starting to feel better but did not go to a doctor to get tested for anything. No ear ache. No vomiting or diarrhea.     Allergies   Allergen Reactions    Aspirin Swelling        Outpatient Medications Marked as Taking for the 2/28/25 encounter (Office Visit) with Brandin Rodriguez DO   Medication Sig Dispense Refill    alendronate (FOSAMAX) 70 MG tablet Take 1 tablet by mouth 1 (One) Time Per Week. Take on an empty stomach before breakfast with 8 ounces of water. Remain upright for 30 minutes after taking. 12 tablet 1    amLODIPine (NORVASC) 2.5 MG tablet TAKE 1 TABLET BY MOUTH DAILY 90 tablet 2    atorvastatin (LIPITOR) 40 MG tablet TAKE 1 TABLET BY MOUTH DAILY 90 tablet 1    levothyroxine (SYNTHROID, LEVOTHROID) 50 MCG tablet TAKE ONE TABLET BY MOUTH DAILY AT LEAST 30 MINUTES BEFOER BREAKFAST WITH A SIP OF WATER FOR LOW THYROID 90 tablet 3    PARoxetine (PAXIL) 10 MG tablet TAKE 1 TABLET BY MOUTH DAILY 90 tablet 2        Past Medical History:   Diagnosis Date    Abnormal ankle brachial index (MANJIT)     Anxiety and depression     Carotid artery stenosis     Hyperlipidemia     Hypertension 2022    Hypothyroidism     Osteoporosis        OBJECTIVE    Vital Signs:   /56   Pulse 59   Temp 98.8 °F  "(37.1 °C) (Oral)   Ht 152.4 cm (60\")   Wt 42.3 kg (93 lb 3.2 oz)   SpO2 98%   BMI 18.20 kg/m²        Physical Exam  Vitals reviewed.   Constitutional:       General: She is not in acute distress.     Appearance: Normal appearance. She is not ill-appearing.   HENT:      Right Ear: Tympanic membrane, ear canal and external ear normal. There is no impacted cerumen.      Left Ear: Tympanic membrane, ear canal and external ear normal. There is no impacted cerumen.      Mouth/Throat:      Mouth: Mucous membranes are moist.      Pharynx: Oropharynx is clear. No oropharyngeal exudate or posterior oropharyngeal erythema.   Eyes:      General: No scleral icterus.  Cardiovascular:      Rate and Rhythm: Normal rate and regular rhythm.      Heart sounds: Normal heart sounds. No murmur heard.  Pulmonary:      Effort: Pulmonary effort is normal. No respiratory distress.      Breath sounds: Normal breath sounds. No wheezing.   Lymphadenopathy:      Cervical: Cervical adenopathy (submandibular) present.   Skin:     Coloration: Skin is not jaundiced.   Neurological:      Mental Status: She is alert.   Psychiatric:         Mood and Affect: Mood normal.         Behavior: Behavior normal.         Thought Content: Thought content normal.                             ASSESSMENT & PLAN     Diagnoses and all orders for this visit:    1. Viral URI (Primary)  -pt presents to the office today for symptoms consistent with viral URI. Exam overall normal aside from some submandibular ANTHONY. She is slightly hypotensive but this is her baseline, afebrile, satting 98% on room air. Lung sounds clear. Occasional cough on exam.   -rapid COVID 19 and flu A /B negative today  -recommended continued supportive care with rest, plenty of fluids and alternate fluids with pedialyte or gatorade, use of over the counter cough/cold medications as needed. Will Rx tessalon perles for symptomatic relief of cough. Alert us if she fails to improve in 5-7 days  or at " any point if experiencing new or worsening symptoms.   -     POCT SARS-CoV-2 Antigen EDUARDA + Flu  -     benzonatate (Tessalon Perles) 100 MG capsule; Take 1 capsule by mouth 3 (Three) Times a Day As Needed for Cough.  Dispense: 30 capsule; Refill: 0            Follow Up  No follow-ups on file.    Patient/family had no further questions at this time and verbalized understanding of the plan discussed today.

## 2025-03-06 ENCOUNTER — TELEPHONE (OUTPATIENT)
Dept: INTERNAL MEDICINE | Facility: CLINIC | Age: 75
End: 2025-03-06

## 2025-03-06 NOTE — TELEPHONE ENCOUNTER
Caller: Nereyda Estrella    Relationship to patient: Self    Best call back number:   Telephone Information:   Mobile [phone]         Patient is needing: PATIENT CALLING TO [facility]VISE SHE IS STILL VERY FATIGUED AND WANTS TO KNOW WHAT HE WOULD RECOMMEND TO HELP  ELECTROLYTE DRINK SHE DID NOT CARE FOR    PLEASE CALL BACK TO ADVISE

## 2025-03-07 ENCOUNTER — TELEPHONE (OUTPATIENT)
Dept: INTERNAL MEDICINE | Facility: CLINIC | Age: 75
End: 2025-03-07

## 2025-03-07 NOTE — TELEPHONE ENCOUNTER
Caller: Nereyda Estrella    Relationship: Self    Best call back number: [phone]     What medication are you requesting: ENERGY PILL AND  MULTIVITAMIN     What are your current symptoms: WEAK         If a prescription is needed, what is your preferred pharmacy and phone number:  [id number] - [place], [place] - [address] AT [facility] - [phone]  - [phone]     Additional notes:  THE ATHLETE DRINKS DOESN'T S GO WELL WITH STOMACH. PATIENT WANTED TO KNOW IF YOU CAN SEND IN THE MEDICATIONS LISTED ABOVE.

## 2025-03-07 NOTE — TELEPHONE ENCOUNTER
"There is no such thing as an \"energy pill\" for a viral respiratory  infection. She is free to take a multivitamin that is over the counter. If she is still feeling poorly next week she needs to come in to be evaluated. If she gets short of air or is having a fever or chills or coughing up anything she needs to go to the urgent care immediately to ensure she does not have pneumonia. "

## 2025-03-21 ENCOUNTER — HOSPITAL ENCOUNTER (OUTPATIENT)
Facility: HOSPITAL | Age: 75
Discharge: HOME | End: 2025-03-21
Attending: EMERGENCY MEDICINE
Payer: MEDICARE

## 2025-03-21 VITALS
DIASTOLIC BLOOD PRESSURE: 86 MMHG | BODY MASS INDEX: 18.55 KG/M2 | OXYGEN SATURATION: 97 % | WEIGHT: 92 LBS | HEIGHT: 59 IN | TEMPERATURE: 98.2 F | RESPIRATION RATE: 16 BRPM | HEART RATE: 88 BPM | SYSTOLIC BLOOD PRESSURE: 131 MMHG

## 2025-03-21 DIAGNOSIS — J02.9 PHARYNGITIS, UNSPECIFIED ETIOLOGY: Primary | ICD-10-CM

## 2025-03-21 DIAGNOSIS — L29.9 ITCHING: ICD-10-CM

## 2025-03-21 LAB
FLUAV SUBTYP SPEC NAA+PROBE: NOT DETECTED
FLUBV RNA ISLT QL NAA+PROBE: NOT DETECTED
SARS-COV-2 RNA RESP QL NAA+PROBE: NOT DETECTED
STREP A PCR: NOT DETECTED

## 2025-03-21 PROCEDURE — G0463 HOSPITAL OUTPT CLINIC VISIT: HCPCS

## 2025-03-21 PROCEDURE — 87636 SARSCOV2 & INF A&B AMP PRB: CPT | Performed by: EMERGENCY MEDICINE

## 2025-03-21 PROCEDURE — 87651 STREP A DNA AMP PROBE: CPT | Performed by: EMERGENCY MEDICINE

## 2025-03-21 PROCEDURE — 99203 OFFICE O/P NEW LOW 30 MIN: CPT

## 2025-03-21 RX ORDER — CETIRIZINE HYDROCHLORIDE 10 MG/1
10 TABLET ORAL NIGHTLY
Qty: 30 TABLET | Refills: 0 | Status: SHIPPED | OUTPATIENT
Start: 2025-03-21

## 2025-03-21 RX ORDER — ALBUTEROL SULFATE 90 UG/1
2 INHALANT RESPIRATORY (INHALATION) EVERY 4 HOURS PRN
Qty: 6.7 G | Refills: 0 | Status: SHIPPED | OUTPATIENT
Start: 2025-03-21

## 2025-03-22 NOTE — FSED PROVIDER NOTE
Subjective   History of Present Illness  Patient is a 74-year-old female who presents to the emergency department for sore throat and congestion that onset yesterday.  Patient reports she had a similar viral illness about a month ago for when she recovered, and is worried about a recurrence today.  Patient feels like her symptoms generally lingered and feels fatigued and weak.  Has been trying to increase fluids.  Reports normal appetite.  Denies shortness of breath, chest pain, vomiting.  Patient also reports she used a new at home hair dye and has had itchy scalp since.  Tried coconut oil without relief.  Has not tried Benadryl.        Review of Systems   Constitutional:  Positive for fatigue. Negative for chills, diaphoresis and fever.   HENT:  Positive for congestion. Negative for ear pain, facial swelling and sore throat.    Eyes:  Negative for visual disturbance.   Respiratory:  Negative for cough, shortness of breath, wheezing and stridor.    Cardiovascular:  Negative for chest pain and leg swelling.   Gastrointestinal:  Negative for abdominal pain, constipation, diarrhea, nausea and vomiting.   Musculoskeletal:  Negative for arthralgias, gait problem and myalgias.   Skin:  Negative for color change, pallor, rash and wound.   Neurological:  Positive for weakness. Negative for dizziness, seizures, syncope, light-headedness, numbness and headaches.   Psychiatric/Behavioral:  Negative for confusion. The patient is not nervous/anxious.        Past Medical History:   Diagnosis Date    Abnormal ankle brachial index (MANJIT)     Anxiety and depression     Carotid artery stenosis     Hyperlipidemia     Hypertension 2022    Hypothyroidism     Osteoporosis        Allergies   Allergen Reactions    Aspirin Swelling       Past Surgical History:   Procedure Laterality Date    NO PAST SURGERIES         Family History   Problem Relation Age of Onset    Hepatitis Mother         from blood transfusion    Prostate cancer Father      Breast cancer Sister     No Known Problems Sister     No Known Problems Sister     No Known Problems Sister     Diabetes Brother     Other Brother         polio    No Known Problems Brother     No Known Problems Brother        Social History     Socioeconomic History    Marital status:    Tobacco Use    Smoking status: Former     Current packs/day: 0.00     Average packs/day: 0.1 packs/day for 1 year (0.1 ttl pk-yrs)     Types: Cigarettes     Start date:      Quit date:      Years since quittin.2    Smokeless tobacco: Never    Tobacco comments:     minimal cigarette use in college   Vaping Use    Vaping status: Never Used   Substance and Sexual Activity    Alcohol use: Yes     Alcohol/week: 7.0 standard drinks of alcohol     Types: 7 Glasses of wine per week    Drug use: Never           Objective   Physical Exam  Constitutional:       General: She is not in acute distress.     Appearance: Normal appearance. She is normal weight. She is not ill-appearing, toxic-appearing or diaphoretic.   HENT:      Head: Normocephalic and atraumatic.      Comments: Normal-appearing scalp.  No rash.     Right Ear: Tympanic membrane, ear canal and external ear normal.      Left Ear: Tympanic membrane, ear canal and external ear normal.      Mouth/Throat:      Mouth: Mucous membranes are moist.      Pharynx: Oropharynx is clear. Posterior oropharyngeal erythema present. No oropharyngeal exudate.      Comments: No tonsillitis.  No exudate or lesions.  Normal swallowing.  No drooling, stridor, wheezing.  No respiratory distress.  Normal speech.  Eyes:      Extraocular Movements: Extraocular movements intact.      Conjunctiva/sclera: Conjunctivae normal.      Pupils: Pupils are equal, round, and reactive to light.   Cardiovascular:      Rate and Rhythm: Normal rate and regular rhythm.   Pulmonary:      Effort: Pulmonary effort is normal. No respiratory distress.      Breath sounds: Normal breath sounds.   Abdominal:       General: Abdomen is flat. There is no distension.      Palpations: Abdomen is soft.      Tenderness: There is no abdominal tenderness.   Skin:     General: Skin is warm and dry.   Neurological:      General: No focal deficit present.      Mental Status: She is alert.   Psychiatric:         Mood and Affect: Mood normal.         Behavior: Behavior normal.         Procedures           ED Course  ED Course as of 03/21/25 2249   Fri Mar 21, 2025   2019 COVID19: Not Detected [AS]   2019 Influenza A PCR: Not Detected [AS]   2019 Influenza B PCR: Not Detected [AS]   2019 STREP A PCR: Not Detected [AS]      ED Course User Index  [AS] Divya Meadows PA-C                                           Medical Decision Making  Patient is a 74-year-old female presents for 1 day of sore throat and congestion.  She overall appears well, no acute stress, nontoxic.  Vital signs WNL.  Patient is worried because she recently had a viral illness about a month ago and thinks it is coming back.  She is negative for COVID, flu, strep today.  Likely some other viral URI.  Discussed continued symptomatic measures for URI as well as scalp itching..  Discussed when to return to the emergency department.  Answered all questions.  Patient verbalized understanding and was agreeable to plan and discharge.    My differential diagnosis for sore throat includes but is not limited to viral pharyngitis, strep pharyngitis, mononucleosis, epiglottitis, esophageal abrasion, peritonsillar abscess, retropharyngeal abscess, tonsillitis, scarlet fever, viral syndrome, COVID-19 and herpetic gingival stomatitis       Problems Addressed:  Itching: complicated acute illness or injury  Pharyngitis, unspecified etiology: complicated acute illness or injury    Amount and/or Complexity of Data Reviewed  Labs:  Decision-making details documented in ED Course.    Risk  OTC drugs.  Prescription drug management.        Final diagnoses:   Pharyngitis, unspecified etiology    Itching       ED Disposition  ED Disposition       ED Disposition   Discharge    Condition   Stable    Comment   --               Brandin Rodriguez DO  [address] [facility] [address]  [place] [address]  [phone]               Medication List        New Prescriptions      albuterol sulfate  (90 Base) MCG/ACT inhaler  Commonly known as: PROVENTIL HFA;VENTOLIN HFA;PROAIR HFA  Inhale 2 puffs Every 4 (Four) Hours As Needed for Wheezing or Shortness of Air.     cetirizine 10 MG tablet  Commonly known as: zyrTEC  Take 1 tablet by mouth Every Night.               Where to Get Your Medications        These medications were sent to [facility] [id number] - [facility] [address] AT [facility] - [phone] [facility] [phone] [facility] [facility] [address]      Phone: [phone]   albuterol sulfate  (90 Base) MCG/ACT inhaler  cetirizine 10 MG tablet

## 2025-03-22 NOTE — DISCHARGE INSTRUCTIONS
Start cetirizine 10 mg at night.  Use Benadryl as indicated for itching.  Will also help with runny nose.  Causes drowsiness.  Do not drive while taking.  Increase electrolyte fluids and small bland meals as discussed.  Use albuterol inhaler as prescribed as needed.  Continue to monitor symptoms closely, and return to emergency department for worsening symptoms or other medical emergencies.  Recommended follow-up with PCP.  Refer to the attached instructions for further information.

## 2025-03-26 DIAGNOSIS — E78.00 PURE HYPERCHOLESTEROLEMIA: ICD-10-CM

## 2025-03-30 RX ORDER — ATORVASTATIN CALCIUM 40 MG/1
40 TABLET, FILM COATED ORAL DAILY
Qty: 90 TABLET | Refills: 2 | Status: SHIPPED | OUTPATIENT
Start: 2025-03-30

## 2025-04-22 ENCOUNTER — OFFICE VISIT (OUTPATIENT)
Dept: INTERNAL MEDICINE | Facility: CLINIC | Age: 75
End: 2025-04-22
Payer: MEDICARE

## 2025-04-22 VITALS
HEART RATE: 71 BPM | RESPIRATION RATE: 16 BRPM | TEMPERATURE: 98.7 F | OXYGEN SATURATION: 98 % | SYSTOLIC BLOOD PRESSURE: 120 MMHG | HEIGHT: 59 IN | DIASTOLIC BLOOD PRESSURE: 58 MMHG | BODY MASS INDEX: 19.35 KG/M2 | WEIGHT: 96 LBS

## 2025-04-22 DIAGNOSIS — J20.8 ACUTE BRONCHITIS DUE TO OTHER SPECIFIED ORGANISMS: ICD-10-CM

## 2025-04-22 DIAGNOSIS — J06.9 UPPER RESPIRATORY TRACT INFECTION, UNSPECIFIED TYPE: Primary | ICD-10-CM

## 2025-04-22 PROBLEM — I10 HYPERTENSION: Status: ACTIVE | Noted: 2022-01-01

## 2025-04-22 LAB
EXPIRATION DATE: NORMAL
EXPIRATION DATE: NORMAL
FLUAV AG NPH QL: NEGATIVE
FLUBV AG NPH QL: NEGATIVE
INTERNAL CONTROL: NORMAL
INTERNAL CONTROL: NORMAL
Lab: NORMAL
Lab: NORMAL
SARS-COV-2 AG UPPER RESP QL IA.RAPID: NOT DETECTED

## 2025-04-22 RX ORDER — DOXYCYCLINE 100 MG/1
100 CAPSULE ORAL 2 TIMES DAILY
Qty: 14 CAPSULE | Refills: 0 | Status: SHIPPED | OUTPATIENT
Start: 2025-04-22

## 2025-04-22 NOTE — PROGRESS NOTES
Subjective     Nereyda Estrella is a 74 y.o. female who presents with   Chief Complaint   Patient presents with    Cough    Sore Throat    Generalized Body Aches    Chills       History of Present Illness     Three day of symptoms.  Chills, weakness, sore throat, body aches.  No HA.  No ear pain.  No sinus pain.   No SOA.  Cough with production of phelgm.      Review of Systems   Constitutional:  Positive for chills.   Respiratory:  Negative for chest tightness and shortness of breath.    Cardiovascular:  Negative for chest pain.   Musculoskeletal:  Positive for arthralgias.       The following portions of the patient's history were reviewed and updated as appropriate: allergies, current medications and problem list.    Patient Active Problem List    Diagnosis Date Noted    Hyperlipidemia     Osteoporosis     Carotid artery stenosis     Anxiety and depression     Hypertension 2022    Furuncle 07/16/2014    Hypothyroidism 07/16/2014       Current Outpatient Medications on File Prior to Visit   Medication Sig Dispense Refill    albuterol sulfate  (90 Base) MCG/ACT inhaler Inhale 2 puffs Every 4 (Four) Hours As Needed for Wheezing or Shortness of Air. 6.7 g 0    alendronate (FOSAMAX) 70 MG tablet Take 1 tablet by mouth 1 (One) Time Per Week. Take on an empty stomach before breakfast with 8 ounces of water. Remain upright for 30 minutes after taking. 12 tablet 1    amLODIPine (NORVASC) 2.5 MG tablet TAKE 1 TABLET BY MOUTH DAILY 90 tablet 2    atorvastatin (LIPITOR) 40 MG tablet TAKE 1 TABLET BY MOUTH DAILY 90 tablet 2    benzonatate (Tessalon Perles) 100 MG capsule Take 1 capsule by mouth 3 (Three) Times a Day As Needed for Cough. 30 capsule 0    cetirizine (zyrTEC) 10 MG tablet Take 1 tablet by mouth Every Night. 30 tablet 0    levothyroxine (SYNTHROID, LEVOTHROID) 50 MCG tablet TAKE ONE TABLET BY MOUTH DAILY AT LEAST 30 MINUTES BEFOER BREAKFAST WITH A SIP OF WATER FOR LOW THYROID 90 tablet 3    PARoxetine  "(PAXIL) 10 MG tablet TAKE 1 TABLET BY MOUTH DAILY 90 tablet 2     No current facility-administered medications on file prior to visit.       Objective     /58   Pulse 71   Temp 98.7 °F (37.1 °C) (Infrared)   Resp 16   Ht 149.9 cm (59\")   Wt 43.5 kg (96 lb)   SpO2 98%   BMI 19.39 kg/m²     Physical Exam  Constitutional:       Appearance: She is well-developed.   HENT:      Head: Normocephalic and atraumatic.      Right Ear: Hearing and tympanic membrane normal.      Left Ear: Hearing and tympanic membrane normal.      Mouth/Throat:      Pharynx: No oropharyngeal exudate or posterior oropharyngeal erythema.   Cardiovascular:      Rate and Rhythm: Normal rate and regular rhythm.      Heart sounds: Normal heart sounds.   Pulmonary:      Effort: Pulmonary effort is normal.      Breath sounds: Normal breath sounds.   Skin:     General: Skin is warm and dry.   Neurological:      Mental Status: She is alert and oriented to person, place, and time.   Psychiatric:         Behavior: Behavior normal.     X-rays of the chest  performed today for following indication:   cough.  X-ray reveal nad.  There is no available x-ray for comparison.  X-ray sent to radiology for official interpretation and findings.        Assessment & Plan   Diagnoses and all orders for this visit:    1. Upper respiratory tract infection, unspecified type (Primary)  -     POC Influenza A / B  -     POCT EBONIE SARS-CoV-2 Antigen EDUARDA  -     XR Chest PA & Lateral    2. Acute bronchitis due to other specified organisms    Other orders  -     doxycycline (VIBRAMYCIN) 100 MG capsule; Take 1 capsule by mouth 2 (Two) Times a Day.  Dispense: 14 capsule; Refill: 0        Discussion    Patient presents with episode of acute bronchitis.  A prescription for antibiotics is provided today.  The patient is instructed to take along with Mucinex DM and tylenol.  Let me know they are not feeling better over the next 3 days or if there is any change in symptoms.  "          Future Appointments   Date Time Provider Department Center   7/23/2025  3:45 PM Brandin Rodriguez DO [facility]

## 2025-04-30 DIAGNOSIS — E03.9 HYPOTHYROIDISM, UNSPECIFIED TYPE: ICD-10-CM

## 2025-05-01 RX ORDER — LEVOTHYROXINE SODIUM 50 UG/1
TABLET ORAL
Qty: 90 TABLET | Refills: 3 | Status: SHIPPED | OUTPATIENT
Start: 2025-05-01

## 2025-06-12 DIAGNOSIS — M81.6 LOCALIZED OSTEOPOROSIS WITHOUT CURRENT PATHOLOGICAL FRACTURE: ICD-10-CM

## 2025-06-12 RX ORDER — ALENDRONATE SODIUM 70 MG/1
70 TABLET ORAL WEEKLY
Qty: 12 TABLET | Refills: 3 | Status: SHIPPED | OUTPATIENT
Start: 2025-06-12

## 2025-07-19 DIAGNOSIS — J06.9 VIRAL URI: ICD-10-CM

## 2025-07-21 RX ORDER — BENZONATATE 100 MG/1
CAPSULE ORAL
Qty: 30 CAPSULE | Refills: 0 | Status: SHIPPED | OUTPATIENT
Start: 2025-07-21

## 2025-07-23 ENCOUNTER — OFFICE VISIT (OUTPATIENT)
Dept: INTERNAL MEDICINE | Facility: CLINIC | Age: 75
End: 2025-07-23
Payer: MEDICARE

## 2025-07-23 VITALS
OXYGEN SATURATION: 98 % | BODY MASS INDEX: 19.39 KG/M2 | HEART RATE: 52 BPM | TEMPERATURE: 98 F | WEIGHT: 92.4 LBS | HEIGHT: 58 IN | DIASTOLIC BLOOD PRESSURE: 68 MMHG | RESPIRATION RATE: 12 BRPM | SYSTOLIC BLOOD PRESSURE: 103 MMHG

## 2025-07-23 DIAGNOSIS — M81.6 LOCALIZED OSTEOPOROSIS WITHOUT CURRENT PATHOLOGICAL FRACTURE: ICD-10-CM

## 2025-07-23 DIAGNOSIS — Z00.00 ANNUAL PHYSICAL EXAM: Primary | ICD-10-CM

## 2025-07-23 DIAGNOSIS — Z12.31 ENCOUNTER FOR SCREENING MAMMOGRAM FOR MALIGNANT NEOPLASM OF BREAST: ICD-10-CM

## 2025-07-23 DIAGNOSIS — E78.00 PURE HYPERCHOLESTEROLEMIA: ICD-10-CM

## 2025-07-23 DIAGNOSIS — I10 ESSENTIAL HYPERTENSION: ICD-10-CM

## 2025-07-23 RX ORDER — IBUPROFEN 200 MG
200 TABLET ORAL DAILY PRN
COMMUNITY

## 2025-07-23 NOTE — PROGRESS NOTES
"  Brandin Rodriguez DO, FACP  Internal Medicine  [facility]  [address], [address]  [place] [address]  [phone]    Chief Complaint  Annual checkup    HISTORY    Nereyda Estrella is a 74 y.o. female who presents to the office today as a  an established patient for their annual preventative exam.     No hospitalization(s) within the last year.     Current exercise regimen: she walks every day at her apartment and does some brisk walking when the weather permits . Also does some stretching exercises.     Status of chronic medical conditions:    Anxiety and depression: takes paroxetine 10 mg daily. States her mood is pretty good \"I'm OK\".     HLD: takes atorvastatin 40 mg daily for primary prevention.   Lab Results   Component Value Date    CHLPL 157 07/11/2024    TRIG 123 07/11/2024    HDL 59 07/11/2024    LDL 76 07/11/2024     Hypothyroidism: takes levothyroxine 50 mcg daily; denies having any thyroid nodules/cancer   Lab Results   Component Value Date    TSH 1.650 07/11/2024     HTN: taking amlodipine 2.5 mg daily. Doesn't check blood pressure at home. states she has been feeling lightheaded every 2 days , such as when she states up as well as a sensation of weakness. This has been going on for several months at least.    Osteoporosis:  No bone fracture history.  She takes calcium and vitamin D every day. Taking alendronate 70 mg weekly, start 3/2024.     Patient's overall comments about their health or any other specific health concerns they report: none        Health Maintenance Summary            Current Care Gaps       COVID-19 Vaccine (7 - 2024-25 season) Overdue since 3/30/2025      09/30/2024  Imm Admin: COVID-19 (PFIZER) 12YRS+ (COMIRNATY)    01/11/2024  Imm Admin: COVID-19 F23 (PFIZER) 12YRS+ (COMIRNATY)    09/23/2022  Imm Admin: COVID-19 (PFIZER) BIVALENT 12+YRS    11/22/2021  Imm Admin: COVID-19 (PFIZER) Purple Cap Monovalent    03/31/2021  Imm Admin: COVID-19 (PFIZER) Purple " Cap Monovalent     Only the first 5 history entries have been loaded, but more history exists.            TDAP/TD VACCINES (1 - Tdap) Never done      No completion history exists for this topic.              ZOSTER VACCINE (1 of 2) Never done     No completion history exists for this topic.                      Awaiting Completion       LIPID PANEL (Yearly) Order placed this encounter      07/23/2025  Order placed for Lipid Panel by Brandin Rodriguez DO    07/11/2024  Lipid Panel    11/03/2022  Lipid Panel With LDL/HDL Ratio    07/14/2022  Lipid Panel With LDL/HDL Ratio              MAMMOGRAM (Every 2 Years) Order placed this encounter      07/23/2025  Order placed for Mammo Screening Digital Tomosynthesis Bilateral With CAD by Brandin Rodriguez DO    06/18/2024  MAMMO Scan    08/26/2022  SCANNED - MAMMO                      Upcoming       INFLUENZA VACCINE (Yearly - October to March) Next due on 10/1/2025     No completion history exists for this topic.              ANNUAL WELLNESS VISIT (Yearly) Next due on 1/13/2026 01/13/2025  Level of Service: [place] PPPS, SUBSEQ VISIT    01/13/2025  Done    01/11/2024  Level of Service: [place] PPPS, SUBSEQ VISIT    01/11/2024  Done              DXA SCAN (Every 2 Years) Next due on 3/14/2026      03/14/2024  DEXA Bone Density Axial              COLORECTAL CANCER SCREENING (COLOGUARD - Every 3 Years) Next due on 1/21/2027 01/21/2024  Cologuard component of Cologuard - Stool, Per Rectum    10/08/2020  SCANNED - COLOGUARD    10/01/2020  Cologuard - ,                      Completed or No Longer Recommended       HEPATITIS C SCREENING  Completed      07/14/2022  Hep C Virus Ab component of Hepatitis C antibody              Pneumococcal Vaccine 50+ (Series Information) Completed      07/11/2024  Imm Admin: Pneumococcal Conjugate 20-Valent (PCV20)                             Allergies   Allergen Reactions    Aspirin Swelling        Outpatient Medications Marked as Taking for the 7/23/25  encounter (Office Visit) with Brandin Rodriguez DO   Medication Sig Dispense Refill    albuterol sulfate  (90 Base) MCG/ACT inhaler Inhale 2 puffs Every 4 (Four) Hours As Needed for Wheezing or Shortness of Air. 6.7 g 0    alendronate (FOSAMAX) 70 MG tablet TAKE 1 TABLET BY MOUTH ONCE WEEKLY ON AN EMPTY STOMACH BEFORE BREAKFAST. REMAIN UPRIGHT FOR 30 MINUTES AND TAKE WITH 8 OUNCES OF WATER 12 tablet 3    atorvastatin (LIPITOR) 40 MG tablet TAKE 1 TABLET BY MOUTH DAILY 90 tablet 2    benzonatate (TESSALON) 100 MG capsule TAKE 1 CAPSULE BY MOUTH 3 TIMES A DAY AS NEEDED FOR COUGH 30 capsule 0    ibuprofen (ADVIL,MOTRIN) 200 MG tablet Take 1 tablet by mouth Daily As Needed for Moderate Pain.      levothyroxine (SYNTHROID, LEVOTHROID) 50 MCG tablet TAKE 1 TABLET BY MOUTH DAILY AT LEAST 30 MINUTES BEFORE BREAKFAST WITH A SIP OF WATER FOR LOW THYROID 90 tablet 3    PARoxetine (PAXIL) 10 MG tablet TAKE 1 TABLET BY MOUTH DAILY 90 tablet 2    [DISCONTINUED] amLODIPine (NORVASC) 2.5 MG tablet TAKE 1 TABLET BY MOUTH DAILY 90 tablet 2        Past Medical History:   Diagnosis Date    Abnormal ankle brachial index (MANJIT)     Anxiety and depression     Carotid artery stenosis     Hyperlipidemia     Hypertension 2022    Hypothyroidism     Osteoporosis      Past Surgical History:   Procedure Laterality Date    NO PAST SURGERIES       Family History   Problem Relation Age of Onset    Hepatitis Mother         from blood transfusion    Prostate cancer Father     Breast cancer Sister     No Known Problems Sister     No Known Problems Sister     No Known Problems Sister     Diabetes Brother     Other Brother         polio    No Known Problems Brother     No Known Problems Brother     reports that she quit smoking about 56 years ago. Her smoking use included cigarettes. She started smoking about 57 years ago. She has a 0.1 pack-year smoking history. She has never used smokeless tobacco. She reports current alcohol use of about 4.0  "standard drinks of alcohol per week. She reports that she does not use drugs.    Immunization History   Administered Date(s) Administered    COVID-19 (PFIZER) 12YRS+ (COMIRNATY) 01/11/2024, 09/30/2024    COVID-19 (PFIZER) BIVALENT 12+YRS 09/23/2022    COVID-19 (PFIZER) Purple Cap Monovalent 03/10/2021, 03/31/2021, 11/22/2021    Pneumococcal Conjugate 20-Valent (PCV20) 07/11/2024        OBJECTIVE    Vital Signs:   /68 (BP Location: Left arm, Patient Position: Sitting, Cuff Size: Adult)   Pulse 52   Temp 98 °F (36.7 °C) (Tympanic)   Resp 12   Ht 147.3 cm (58\")   Wt 41.9 kg (92 lb 6.4 oz)   SpO2 98%   BMI 19.31 kg/m²     Physical Exam  Vitals reviewed.   Constitutional:       General: She is not in acute distress.     Appearance: Normal appearance. She is normal weight. She is not ill-appearing.   HENT:      Head: Normocephalic and atraumatic.      Right Ear: Tympanic membrane, ear canal and external ear normal. There is no impacted cerumen.      Left Ear: Tympanic membrane, ear canal and external ear normal. There is no impacted cerumen.      Mouth/Throat:      Mouth: Mucous membranes are moist.      Pharynx: No oropharyngeal exudate or posterior oropharyngeal erythema.   Eyes:      General: No scleral icterus.     Extraocular Movements: Extraocular movements intact.      Conjunctiva/sclera: Conjunctivae normal.      Pupils: Pupils are equal, round, and reactive to light.   Cardiovascular:      Rate and Rhythm: Normal rate and regular rhythm.      Heart sounds: Normal heart sounds. No murmur heard.  Pulmonary:      Effort: Pulmonary effort is normal. No respiratory distress.      Breath sounds: Normal breath sounds. No wheezing.   Abdominal:      General: Bowel sounds are normal. There is no distension.      Palpations: Abdomen is soft.      Tenderness: There is no abdominal tenderness. There is no guarding.   Musculoskeletal:      Cervical back: Neck supple.      Right lower leg: No edema.      Left " lower leg: No edema.   Lymphadenopathy:      Cervical: No cervical adenopathy.   Skin:     General: Skin is warm and dry.      Coloration: Skin is not jaundiced.   Neurological:      General: No focal deficit present.      Mental Status: She is alert and oriented to person, place, and time.      Cranial Nerves: No cranial nerve deficit.      Motor: No weakness.   Psychiatric:         Mood and Affect: Mood normal.         Behavior: Behavior normal.         Thought Content: Thought content normal.                         The 10-year ASCVD risk score (Paolo JOYCE, et al., 2019) is: 9.4%    Values used to calculate the score:      Age: 74 years      Sex: Female      Is Non- : No      Diabetic: No      Tobacco smoker: No      Systolic Blood Pressure: 103 mmHg      Is BP treated: No      HDL Cholesterol: 59 mg/dL      Total Cholesterol: 157 mg/dL     ASSESSMENT & PLAN     #Annual Preventative Health Examination   -Age and sex appropriate physical exam performed and documented. Updated past medical, family, social and surgical histories as well as allergies and care team list. Addressed care gaps listed in the medical record.  -Encouraged annual dental and vision exams as part of their overall health.  -Encouraged minimum of 30 minutes or more of exercise at a brisk walk or higher 5 days per week combined with a well-balanced diet.  -Immunizations reviewed and updated in EMR. Td, Influenza, Shingrix, COVID19, and RSV (Respiratory Syncytial Virus) recommended.  -Lipid screening:  Patient is already on statin therapy.. See plan below.  -Aspirin for primary or secondary prevention: Not applicable, patient is greater than age 60 and risks outweigh benefits for primary prevention.  -Depression and Anxiety screening: Patient has known diagnosis of depression and / or anxiety.  -Diabetes screening:  Screening not indicated at this time.  -Tobacco use screening: Conducted and addressed if indicated.    -Alcohol use screening: Conducted and addressed if indicated.   -Illicit drug screening: Conducted and addressed if indicated.   -Hypertension screening: Patient with known diagnosis of hypertension and is receiving treatment.  -HIV screening: Patient is over age 65, screening not indicated.   -Syphilis screening: Syphilis screening not indicated.  -Hepatitis B virus screening: Screening not indicated, not in a high-risk group.  -Hepatitis C virus screening:  Patient has already completed Hepatitis C screening. Negative screening on file.   -Colon cancer screening: colzach negative Jan 2024, repeat at 3 years time  -Lung cancer screening: Patient has smoked but does not meet other eligibility criteria for screening.  -Cervical cancer screening: aged out of screening  -Breast cancer screening:  last in 2024, will order updated today.  -Osteoporosis screening: known osteoporosis     Follow up in 1 year for annual physical exam.    Patient/family had no further questions at this time and verbalized understanding of the plan discussed today.     A problem-based visit was also conducted on the same day, see below for assessment and plan    Diagnoses and all orders for this visit:    1. Localized osteoporosis without current pathological fracture (Primary)  -  No bone fracture history.  She takes calcium and vitamin D every day. Taking alendronate 70 mg weekly, start 3/2024.   -recheck vitamin D and calcium for continued monitoring, make recommendations on supplementation as needed.    2. Pure hypercholesterolemia  -takes atorvastatin 40 mg daily for primary prevention.   Lab Results   Component Value Date    CHLPL 157 07/11/2024    TRIG 123 07/11/2024    HDL 59 07/11/2024    LDL 76 07/11/2024     -LDL goal less than 100 . Last year was at goal. Repeat today for continued annual monitoring, adjust regimen as needed.     3. Essential hypertension  - taking amlodipine 2.5 mg daily. Doesn't check blood pressure at home.  states she has been feeling lightheaded every 2 days , such as when she states up as well as a sensation of weakness. This has been going on for several months at least.  -BP low at 103/60. Last office visit in 4/2025 she was also borderline low for her age at 120/58. Recommend STOPPING amlodipine and returning to office in 1 month for a follow up of symptoms.           The following social determinates of health impact the patient's medical decision making: No social determinates of health were factored in to today's visit.     Follow Up  Return in about 4 weeks (around 8/20/2025) for Recheck.

## 2025-07-24 LAB
25(OH)D3+25(OH)D2 SERPL-MCNC: 59.4 NG/ML (ref 30–100)
ALBUMIN SERPL-MCNC: 4.6 G/DL (ref 3.5–5.2)
ALBUMIN/GLOB SERPL: 1.8 G/DL
ALP SERPL-CCNC: 80 U/L (ref 39–117)
ALT SERPL-CCNC: 27 U/L (ref 1–33)
AST SERPL-CCNC: 25 U/L (ref 1–32)
BASOPHILS # BLD AUTO: 0.03 10*3/MM3 (ref 0–0.2)
BASOPHILS NFR BLD AUTO: 0.5 % (ref 0–1.5)
BILIRUB SERPL-MCNC: 0.6 MG/DL (ref 0–1.2)
BUN SERPL-MCNC: 16 MG/DL (ref 8–23)
BUN/CREAT SERPL: 23.9 (ref 7–25)
CALCIUM SERPL-MCNC: 9.4 MG/DL (ref 8.6–10.5)
CHLORIDE SERPL-SCNC: 107 MMOL/L (ref 98–107)
CHOLEST SERPL-MCNC: 147 MG/DL (ref 0–200)
CO2 SERPL-SCNC: 24.6 MMOL/L (ref 22–29)
CREAT SERPL-MCNC: 0.67 MG/DL (ref 0.57–1)
EGFRCR SERPLBLD CKD-EPI 2021: 91.8 ML/MIN/1.73
EOSINOPHIL # BLD AUTO: 0.26 10*3/MM3 (ref 0–0.4)
EOSINOPHIL NFR BLD AUTO: 4.2 % (ref 0.3–6.2)
ERYTHROCYTE [DISTWIDTH] IN BLOOD BY AUTOMATED COUNT: 12.8 % (ref 12.3–15.4)
GLOBULIN SER CALC-MCNC: 2.5 GM/DL
GLUCOSE SERPL-MCNC: 74 MG/DL (ref 65–99)
HCT VFR BLD AUTO: 42.8 % (ref 34–46.6)
HDLC SERPL-MCNC: 57 MG/DL (ref 40–60)
HGB BLD-MCNC: 14.2 G/DL (ref 12–15.9)
IMM GRANULOCYTES # BLD AUTO: 0.01 10*3/MM3 (ref 0–0.05)
IMM GRANULOCYTES NFR BLD AUTO: 0.2 % (ref 0–0.5)
LDLC SERPL CALC-MCNC: 75 MG/DL (ref 0–100)
LYMPHOCYTES # BLD AUTO: 1.84 10*3/MM3 (ref 0.7–3.1)
LYMPHOCYTES NFR BLD AUTO: 30 % (ref 19.6–45.3)
MCH RBC QN AUTO: 31.4 PG (ref 26.6–33)
MCHC RBC AUTO-ENTMCNC: 33.2 G/DL (ref 31.5–35.7)
MCV RBC AUTO: 94.7 FL (ref 79–97)
MONOCYTES # BLD AUTO: 0.52 10*3/MM3 (ref 0.1–0.9)
MONOCYTES NFR BLD AUTO: 8.5 % (ref 5–12)
NEUTROPHILS # BLD AUTO: 3.47 10*3/MM3 (ref 1.7–7)
NEUTROPHILS NFR BLD AUTO: 56.6 % (ref 42.7–76)
NRBC BLD AUTO-RTO: 0 /100 WBC (ref 0–0.2)
PLATELET # BLD AUTO: 243 10*3/MM3 (ref 140–450)
POTASSIUM SERPL-SCNC: 4.1 MMOL/L (ref 3.5–5.2)
PROT SERPL-MCNC: 7.1 G/DL (ref 6–8.5)
RBC # BLD AUTO: 4.52 10*6/MM3 (ref 3.77–5.28)
SODIUM SERPL-SCNC: 141 MMOL/L (ref 136–145)
TRIGL SERPL-MCNC: 80 MG/DL (ref 0–150)
TSH SERPL DL<=0.005 MIU/L-ACNC: 1.82 UIU/ML (ref 0.27–4.2)
VLDLC SERPL CALC-MCNC: 15 MG/DL (ref 5–40)
WBC # BLD AUTO: 6.13 10*3/MM3 (ref 3.4–10.8)

## 2025-08-01 ENCOUNTER — TELEPHONE (OUTPATIENT)
Dept: INTERNAL MEDICINE | Facility: CLINIC | Age: 75
End: 2025-08-01
Payer: MEDICARE

## 2025-08-01 NOTE — TELEPHONE ENCOUNTER
Patient called and stated that she would like a OBGYN recommendation. Recommended patient call [facility] and gave patient number. Patient agreed to call and make an appointment

## 2025-08-24 RX ORDER — ERGOCALCIFEROL 1.25 MG/1
50000 CAPSULE, LIQUID FILLED ORAL WEEKLY
Qty: 8 CAPSULE | Refills: 0 | OUTPATIENT
Start: 2025-08-24